# Patient Record
Sex: MALE | Race: WHITE | NOT HISPANIC OR LATINO | URBAN - METROPOLITAN AREA
[De-identification: names, ages, dates, MRNs, and addresses within clinical notes are randomized per-mention and may not be internally consistent; named-entity substitution may affect disease eponyms.]

---

## 2017-02-13 ENCOUNTER — TRANSCRIBE ORDERS (OUTPATIENT)
Dept: LAB | Facility: CLINIC | Age: 37
End: 2017-02-13

## 2017-02-13 ENCOUNTER — APPOINTMENT (OUTPATIENT)
Dept: LAB | Facility: CLINIC | Age: 37
End: 2017-02-13
Payer: COMMERCIAL

## 2017-02-13 DIAGNOSIS — R73.01 IMPAIRED FASTING GLUCOSE: ICD-10-CM

## 2017-02-13 DIAGNOSIS — E55.9 UNSPECIFIED VITAMIN D DEFICIENCY: ICD-10-CM

## 2017-02-13 DIAGNOSIS — E78.2 MIXED HYPERLIPIDEMIA: Primary | ICD-10-CM

## 2017-02-13 DIAGNOSIS — E78.2 MIXED HYPERLIPIDEMIA: ICD-10-CM

## 2017-02-13 LAB
25(OH)D3 SERPL-MCNC: 32.1 NG/ML (ref 30–100)
ALBUMIN SERPL BCP-MCNC: 3.3 G/DL (ref 3.5–5)
ALP SERPL-CCNC: 76 U/L (ref 46–116)
ALT SERPL W P-5'-P-CCNC: 56 U/L (ref 12–78)
ANION GAP SERPL CALCULATED.3IONS-SCNC: 7 MMOL/L (ref 4–13)
AST SERPL W P-5'-P-CCNC: 28 U/L (ref 5–45)
BILIRUB SERPL-MCNC: 0.27 MG/DL (ref 0.2–1)
BUN SERPL-MCNC: 15 MG/DL (ref 5–25)
CALCIUM SERPL-MCNC: 8.9 MG/DL (ref 8.3–10.1)
CHLORIDE SERPL-SCNC: 100 MMOL/L (ref 100–108)
CHOLEST SERPL-MCNC: 239 MG/DL (ref 50–200)
CO2 SERPL-SCNC: 29 MMOL/L (ref 21–32)
CREAT SERPL-MCNC: 0.97 MG/DL (ref 0.6–1.3)
EST. AVERAGE GLUCOSE BLD GHB EST-MCNC: 117 MG/DL
GFR SERPL CREATININE-BSD FRML MDRD: >60 ML/MIN/1.73SQ M
GLUCOSE SERPL-MCNC: 80 MG/DL (ref 65–140)
HBA1C MFR BLD: 5.7 % (ref 4.2–6.3)
HDLC SERPL-MCNC: 51 MG/DL (ref 40–60)
LDLC SERPL CALC-MCNC: 152 MG/DL (ref 0–100)
POTASSIUM SERPL-SCNC: 4.5 MMOL/L (ref 3.5–5.3)
PROT SERPL-MCNC: 8 G/DL (ref 6.4–8.2)
SODIUM SERPL-SCNC: 136 MMOL/L (ref 136–145)
TRIGL SERPL-MCNC: 180 MG/DL
TSH SERPL DL<=0.05 MIU/L-ACNC: 1.69 UIU/ML (ref 0.36–3.74)

## 2017-02-13 PROCEDURE — 80061 LIPID PANEL: CPT

## 2017-02-13 PROCEDURE — 80053 COMPREHEN METABOLIC PANEL: CPT

## 2017-02-13 PROCEDURE — 84443 ASSAY THYROID STIM HORMONE: CPT

## 2017-02-13 PROCEDURE — 36415 COLL VENOUS BLD VENIPUNCTURE: CPT

## 2017-02-13 PROCEDURE — 82306 VITAMIN D 25 HYDROXY: CPT

## 2017-02-13 PROCEDURE — 83036 HEMOGLOBIN GLYCOSYLATED A1C: CPT

## 2017-05-03 ENCOUNTER — TRANSCRIBE ORDERS (OUTPATIENT)
Dept: MRI IMAGING | Facility: CLINIC | Age: 37
End: 2017-05-03

## 2017-05-03 ENCOUNTER — APPOINTMENT (OUTPATIENT)
Dept: LAB | Facility: CLINIC | Age: 37
End: 2017-05-03
Payer: COMMERCIAL

## 2017-05-03 DIAGNOSIS — E78.01 ESSENTIAL FAMILIAL HYPERCHOLESTEROLEMIA: ICD-10-CM

## 2017-05-03 DIAGNOSIS — R33.9 RETENTION OF URINE, UNSPECIFIED: ICD-10-CM

## 2017-05-03 DIAGNOSIS — E78.2 MIXED HYPERLIPIDEMIA: ICD-10-CM

## 2017-05-03 DIAGNOSIS — E78.2 MIXED HYPERLIPIDEMIA: Primary | ICD-10-CM

## 2017-05-03 LAB
ALBUMIN SERPL BCP-MCNC: 3.2 G/DL (ref 3.5–5)
ALP SERPL-CCNC: 69 U/L (ref 46–116)
ALT SERPL W P-5'-P-CCNC: 68 U/L (ref 12–78)
ANION GAP SERPL CALCULATED.3IONS-SCNC: 6 MMOL/L (ref 4–13)
AST SERPL W P-5'-P-CCNC: 30 U/L (ref 5–45)
BACTERIA UR QL AUTO: NORMAL /HPF
BILIRUB SERPL-MCNC: 0.31 MG/DL (ref 0.2–1)
BILIRUB UR QL STRIP: NEGATIVE
BUN SERPL-MCNC: 12 MG/DL (ref 5–25)
CALCIUM SERPL-MCNC: 8.9 MG/DL (ref 8.3–10.1)
CHLORIDE SERPL-SCNC: 102 MMOL/L (ref 100–108)
CHOLEST SERPL-MCNC: 154 MG/DL (ref 50–200)
CLARITY UR: CLEAR
CO2 SERPL-SCNC: 30 MMOL/L (ref 21–32)
COLOR UR: YELLOW
CREAT SERPL-MCNC: 0.98 MG/DL (ref 0.6–1.3)
EST. AVERAGE GLUCOSE BLD GHB EST-MCNC: 120 MG/DL
GFR SERPL CREATININE-BSD FRML MDRD: >60 ML/MIN/1.73SQ M
GLUCOSE P FAST SERPL-MCNC: 86 MG/DL (ref 65–99)
GLUCOSE UR STRIP-MCNC: NEGATIVE MG/DL
HBA1C MFR BLD: 5.8 % (ref 4.2–6.3)
HDLC SERPL-MCNC: 44 MG/DL (ref 40–60)
HGB UR QL STRIP.AUTO: NEGATIVE
HYALINE CASTS #/AREA URNS LPF: NORMAL /LPF
KETONES UR STRIP-MCNC: NEGATIVE MG/DL
LDLC SERPL CALC-MCNC: 85 MG/DL (ref 0–100)
LEUKOCYTE ESTERASE UR QL STRIP: NEGATIVE
NITRITE UR QL STRIP: NEGATIVE
NON-SQ EPI CELLS URNS QL MICRO: NORMAL /HPF
PH UR STRIP.AUTO: 6.5 [PH] (ref 4.5–8)
POTASSIUM SERPL-SCNC: 4.4 MMOL/L (ref 3.5–5.3)
PROT SERPL-MCNC: 7.5 G/DL (ref 6.4–8.2)
PROT UR STRIP-MCNC: NEGATIVE MG/DL
PSA SERPL-MCNC: 0.1 NG/ML (ref 0–4)
RBC #/AREA URNS AUTO: NORMAL /HPF
SODIUM SERPL-SCNC: 138 MMOL/L (ref 136–145)
SP GR UR STRIP.AUTO: 1.03 (ref 1–1.03)
TRIGL SERPL-MCNC: 123 MG/DL
UROBILINOGEN UR QL STRIP.AUTO: 0.2 E.U./DL
WBC #/AREA URNS AUTO: NORMAL /HPF

## 2017-05-03 PROCEDURE — G0103 PSA SCREENING: HCPCS

## 2017-05-03 PROCEDURE — 80061 LIPID PANEL: CPT

## 2017-05-03 PROCEDURE — 36415 COLL VENOUS BLD VENIPUNCTURE: CPT

## 2017-05-03 PROCEDURE — 81001 URINALYSIS AUTO W/SCOPE: CPT | Performed by: FAMILY MEDICINE

## 2017-05-03 PROCEDURE — 83036 HEMOGLOBIN GLYCOSYLATED A1C: CPT

## 2017-05-03 PROCEDURE — 80053 COMPREHEN METABOLIC PANEL: CPT

## 2017-07-21 ENCOUNTER — TRANSCRIBE ORDERS (OUTPATIENT)
Dept: RADIOLOGY | Facility: CLINIC | Age: 37
End: 2017-07-21

## 2017-07-21 ENCOUNTER — APPOINTMENT (OUTPATIENT)
Dept: LAB | Facility: CLINIC | Age: 37
End: 2017-07-21
Payer: COMMERCIAL

## 2017-07-21 DIAGNOSIS — Z79.899 ENCOUNTER FOR LONG-TERM (CURRENT) USE OF OTHER MEDICATIONS: ICD-10-CM

## 2017-07-21 DIAGNOSIS — Z79.899 ENCOUNTER FOR LONG-TERM (CURRENT) USE OF OTHER MEDICATIONS: Primary | ICD-10-CM

## 2017-07-21 LAB
ALBUMIN SERPL BCP-MCNC: 3.5 G/DL (ref 3.5–5)
ALP SERPL-CCNC: 86 U/L (ref 46–116)
ALT SERPL W P-5'-P-CCNC: 54 U/L (ref 12–78)
AST SERPL W P-5'-P-CCNC: 25 U/L (ref 5–45)
BASOPHILS # BLD AUTO: 0.16 THOUSANDS/ΜL (ref 0–0.1)
BASOPHILS NFR BLD AUTO: 2 % (ref 0–1)
BILIRUB DIRECT SERPL-MCNC: 0.09 MG/DL (ref 0–0.2)
BILIRUB SERPL-MCNC: 0.35 MG/DL (ref 0.2–1)
CHOLEST SERPL-MCNC: 165 MG/DL (ref 50–200)
EOSINOPHIL # BLD AUTO: 0.12 THOUSAND/ΜL (ref 0–0.61)
EOSINOPHIL NFR BLD AUTO: 2 % (ref 0–6)
ERYTHROCYTE [DISTWIDTH] IN BLOOD BY AUTOMATED COUNT: 15.8 % (ref 11.6–15.1)
EST. AVERAGE GLUCOSE BLD GHB EST-MCNC: 123 MG/DL
GLUCOSE P FAST SERPL-MCNC: 81 MG/DL (ref 65–99)
HBA1C MFR BLD: 5.9 % (ref 4.2–6.3)
HCT VFR BLD AUTO: 46.2 % (ref 36.5–49.3)
HDLC SERPL-MCNC: 47 MG/DL (ref 40–60)
HGB BLD-MCNC: 15.8 G/DL (ref 12–17)
LDLC SERPL CALC-MCNC: 94 MG/DL (ref 0–100)
LYMPHOCYTES # BLD AUTO: 2.43 THOUSANDS/ΜL (ref 0.6–4.47)
LYMPHOCYTES NFR BLD AUTO: 35 % (ref 14–44)
MCH RBC QN AUTO: 32.4 PG (ref 26.8–34.3)
MCHC RBC AUTO-ENTMCNC: 34.2 G/DL (ref 31.4–37.4)
MCV RBC AUTO: 95 FL (ref 82–98)
MONOCYTES # BLD AUTO: 0.57 THOUSAND/ΜL (ref 0.17–1.22)
MONOCYTES NFR BLD AUTO: 8 % (ref 4–12)
NEUTROPHILS # BLD AUTO: 3.63 THOUSANDS/ΜL (ref 1.85–7.62)
NEUTS SEG NFR BLD AUTO: 53 % (ref 43–75)
NRBC BLD AUTO-RTO: 0 /100 WBCS
PLATELET # BLD AUTO: 331 THOUSANDS/UL (ref 149–390)
PMV BLD AUTO: 9.5 FL (ref 8.9–12.7)
PROT SERPL-MCNC: 8 G/DL (ref 6.4–8.2)
RBC # BLD AUTO: 4.88 MILLION/UL (ref 3.88–5.62)
T4 FREE SERPL-MCNC: 0.75 NG/DL (ref 0.76–1.46)
TRIGL SERPL-MCNC: 120 MG/DL
TSH SERPL DL<=0.05 MIU/L-ACNC: 1.1 UIU/ML (ref 0.36–3.74)
WBC # BLD AUTO: 6.95 THOUSAND/UL (ref 4.31–10.16)

## 2017-07-21 PROCEDURE — 84443 ASSAY THYROID STIM HORMONE: CPT

## 2017-07-21 PROCEDURE — 84439 ASSAY OF FREE THYROXINE: CPT

## 2017-07-21 PROCEDURE — 80076 HEPATIC FUNCTION PANEL: CPT

## 2017-07-21 PROCEDURE — 80061 LIPID PANEL: CPT

## 2017-07-21 PROCEDURE — 82947 ASSAY GLUCOSE BLOOD QUANT: CPT

## 2017-07-21 PROCEDURE — 83036 HEMOGLOBIN GLYCOSYLATED A1C: CPT

## 2017-07-21 PROCEDURE — 36415 COLL VENOUS BLD VENIPUNCTURE: CPT

## 2017-07-21 PROCEDURE — 85025 COMPLETE CBC W/AUTO DIFF WBC: CPT

## 2017-08-10 ENCOUNTER — TRANSCRIBE ORDERS (OUTPATIENT)
Dept: RADIOLOGY | Facility: CLINIC | Age: 37
End: 2017-08-10

## 2017-08-10 ENCOUNTER — APPOINTMENT (OUTPATIENT)
Dept: LAB | Facility: CLINIC | Age: 37
End: 2017-08-10
Payer: COMMERCIAL

## 2017-08-10 DIAGNOSIS — E78.2 MIXED HYPERLIPIDEMIA: ICD-10-CM

## 2017-08-10 DIAGNOSIS — E78.2 MIXED HYPERLIPIDEMIA: Primary | ICD-10-CM

## 2017-08-10 LAB
ALBUMIN SERPL BCP-MCNC: 3.4 G/DL (ref 3.5–5)
ALP SERPL-CCNC: 88 U/L (ref 46–116)
ALT SERPL W P-5'-P-CCNC: 50 U/L (ref 12–78)
ANION GAP SERPL CALCULATED.3IONS-SCNC: 8 MMOL/L (ref 4–13)
AST SERPL W P-5'-P-CCNC: 26 U/L (ref 5–45)
BILIRUB SERPL-MCNC: 0.58 MG/DL (ref 0.2–1)
BUN SERPL-MCNC: 14 MG/DL (ref 5–25)
CALCIUM SERPL-MCNC: 8.9 MG/DL (ref 8.3–10.1)
CHLORIDE SERPL-SCNC: 101 MMOL/L (ref 100–108)
CHOLEST SERPL-MCNC: 157 MG/DL (ref 50–200)
CO2 SERPL-SCNC: 29 MMOL/L (ref 21–32)
CREAT SERPL-MCNC: 0.93 MG/DL (ref 0.6–1.3)
EST. AVERAGE GLUCOSE BLD GHB EST-MCNC: 126 MG/DL
GFR SERPL CREATININE-BSD FRML MDRD: 103 ML/MIN/1.73SQ M
GLUCOSE P FAST SERPL-MCNC: 83 MG/DL (ref 65–99)
HBA1C MFR BLD: 6 % (ref 4.2–6.3)
HDLC SERPL-MCNC: 43 MG/DL (ref 40–60)
LDLC SERPL CALC-MCNC: 84 MG/DL (ref 0–100)
POTASSIUM SERPL-SCNC: 4.7 MMOL/L (ref 3.5–5.3)
PROT SERPL-MCNC: 7.3 G/DL (ref 6.4–8.2)
SODIUM SERPL-SCNC: 138 MMOL/L (ref 136–145)
TRIGL SERPL-MCNC: 152 MG/DL
TSH SERPL DL<=0.05 MIU/L-ACNC: 0.93 UIU/ML (ref 0.36–3.74)

## 2017-08-10 PROCEDURE — 83036 HEMOGLOBIN GLYCOSYLATED A1C: CPT

## 2017-08-10 PROCEDURE — 80061 LIPID PANEL: CPT

## 2017-08-10 PROCEDURE — 80053 COMPREHEN METABOLIC PANEL: CPT

## 2017-08-10 PROCEDURE — 36415 COLL VENOUS BLD VENIPUNCTURE: CPT

## 2017-08-10 PROCEDURE — 84443 ASSAY THYROID STIM HORMONE: CPT

## 2018-03-01 ENCOUNTER — TRANSCRIBE ORDERS (OUTPATIENT)
Dept: RADIOLOGY | Facility: CLINIC | Age: 38
End: 2018-03-01

## 2018-03-01 ENCOUNTER — APPOINTMENT (OUTPATIENT)
Dept: LAB | Facility: CLINIC | Age: 38
End: 2018-03-01
Payer: COMMERCIAL

## 2018-03-01 DIAGNOSIS — R73.01 IMPAIRED FASTING GLUCOSE: ICD-10-CM

## 2018-03-01 DIAGNOSIS — E78.5 HYPERLIPIDEMIA, UNSPECIFIED HYPERLIPIDEMIA TYPE: ICD-10-CM

## 2018-03-01 DIAGNOSIS — E55.9 AVITAMINOSIS D: Primary | ICD-10-CM

## 2018-03-01 LAB
25(OH)D3 SERPL-MCNC: 56.3 NG/ML (ref 30–100)
ALBUMIN SERPL BCP-MCNC: 3.2 G/DL (ref 3.5–5)
ALP SERPL-CCNC: 73 U/L (ref 46–116)
ALT SERPL W P-5'-P-CCNC: 45 U/L (ref 12–78)
ANION GAP SERPL CALCULATED.3IONS-SCNC: 4 MMOL/L (ref 4–13)
AST SERPL W P-5'-P-CCNC: 30 U/L (ref 5–45)
BASOPHILS # BLD AUTO: 0.1 THOUSANDS/ΜL (ref 0–0.1)
BASOPHILS NFR BLD AUTO: 1 % (ref 0–1)
BILIRUB SERPL-MCNC: 0.45 MG/DL (ref 0.2–1)
BUN SERPL-MCNC: 14 MG/DL (ref 5–25)
CALCIUM SERPL-MCNC: 8.5 MG/DL (ref 8.3–10.1)
CHLORIDE SERPL-SCNC: 103 MMOL/L (ref 100–108)
CHOLEST SERPL-MCNC: 141 MG/DL (ref 50–200)
CO2 SERPL-SCNC: 30 MMOL/L (ref 21–32)
CREAT SERPL-MCNC: 0.97 MG/DL (ref 0.6–1.3)
EOSINOPHIL # BLD AUTO: 0.11 THOUSAND/ΜL (ref 0–0.61)
EOSINOPHIL NFR BLD AUTO: 1 % (ref 0–6)
ERYTHROCYTE [DISTWIDTH] IN BLOOD BY AUTOMATED COUNT: 16.4 % (ref 11.6–15.1)
EST. AVERAGE GLUCOSE BLD GHB EST-MCNC: 117 MG/DL
GFR SERPL CREATININE-BSD FRML MDRD: 98 ML/MIN/1.73SQ M
GLUCOSE P FAST SERPL-MCNC: 83 MG/DL (ref 65–99)
HBA1C MFR BLD: 5.7 % (ref 4.2–6.3)
HCT VFR BLD AUTO: 43.7 % (ref 36.5–49.3)
HDLC SERPL-MCNC: 49 MG/DL (ref 40–60)
HGB BLD-MCNC: 15.1 G/DL (ref 12–17)
LDLC SERPL CALC-MCNC: 74 MG/DL (ref 0–100)
LYMPHOCYTES # BLD AUTO: 2.11 THOUSANDS/ΜL (ref 0.6–4.47)
LYMPHOCYTES NFR BLD AUTO: 23 % (ref 14–44)
MCH RBC QN AUTO: 32.3 PG (ref 26.8–34.3)
MCHC RBC AUTO-ENTMCNC: 34.6 G/DL (ref 31.4–37.4)
MCV RBC AUTO: 93 FL (ref 82–98)
MONOCYTES # BLD AUTO: 0.84 THOUSAND/ΜL (ref 0.17–1.22)
MONOCYTES NFR BLD AUTO: 9 % (ref 4–12)
NEUTROPHILS # BLD AUTO: 6.11 THOUSANDS/ΜL (ref 1.85–7.62)
NEUTS SEG NFR BLD AUTO: 66 % (ref 43–75)
NRBC BLD AUTO-RTO: 0 /100 WBCS
PLATELET # BLD AUTO: 353 THOUSANDS/UL (ref 149–390)
PMV BLD AUTO: 9.6 FL (ref 8.9–12.7)
POTASSIUM SERPL-SCNC: 4.5 MMOL/L (ref 3.5–5.3)
PROT SERPL-MCNC: 7.5 G/DL (ref 6.4–8.2)
RBC # BLD AUTO: 4.68 MILLION/UL (ref 3.88–5.62)
SODIUM SERPL-SCNC: 137 MMOL/L (ref 136–145)
TRIGL SERPL-MCNC: 89 MG/DL
TSH SERPL DL<=0.05 MIU/L-ACNC: 1.17 UIU/ML (ref 0.36–3.74)
WBC # BLD AUTO: 9.31 THOUSAND/UL (ref 4.31–10.16)

## 2018-03-01 PROCEDURE — 85025 COMPLETE CBC W/AUTO DIFF WBC: CPT

## 2018-03-01 PROCEDURE — 83036 HEMOGLOBIN GLYCOSYLATED A1C: CPT

## 2018-03-01 PROCEDURE — 80061 LIPID PANEL: CPT

## 2018-03-01 PROCEDURE — 84443 ASSAY THYROID STIM HORMONE: CPT

## 2018-03-01 PROCEDURE — 80053 COMPREHEN METABOLIC PANEL: CPT

## 2018-03-01 PROCEDURE — 82306 VITAMIN D 25 HYDROXY: CPT

## 2018-03-01 PROCEDURE — 36415 COLL VENOUS BLD VENIPUNCTURE: CPT

## 2018-07-10 ENCOUNTER — OFFICE VISIT (OUTPATIENT)
Dept: FAMILY MEDICINE CLINIC | Facility: CLINIC | Age: 38
End: 2018-07-10
Payer: COMMERCIAL

## 2018-07-10 VITALS
RESPIRATION RATE: 16 BRPM | DIASTOLIC BLOOD PRESSURE: 70 MMHG | HEART RATE: 100 BPM | WEIGHT: 201.6 LBS | SYSTOLIC BLOOD PRESSURE: 128 MMHG

## 2018-07-10 DIAGNOSIS — J01.80 OTHER ACUTE SINUSITIS, RECURRENCE NOT SPECIFIED: Primary | ICD-10-CM

## 2018-07-10 PROCEDURE — 87070 CULTURE OTHR SPECIMN AEROBIC: CPT | Performed by: FAMILY MEDICINE

## 2018-07-10 PROCEDURE — 99214 OFFICE O/P EST MOD 30 MIN: CPT | Performed by: FAMILY MEDICINE

## 2018-07-10 PROCEDURE — 87147 CULTURE TYPE IMMUNOLOGIC: CPT | Performed by: FAMILY MEDICINE

## 2018-07-10 RX ORDER — ADAPALENE 45 G/G
GEL TOPICAL
COMMUNITY
End: 2018-10-19 | Stop reason: SDUPTHER

## 2018-07-10 RX ORDER — AMOXICILLIN AND CLAVULANATE POTASSIUM 875; 125 MG/1; MG/1
1 TABLET, FILM COATED ORAL EVERY 12 HOURS SCHEDULED
Qty: 20 TABLET | Refills: 0 | Status: SHIPPED | OUTPATIENT
Start: 2018-07-10 | End: 2018-07-20

## 2018-07-10 RX ORDER — ATORVASTATIN CALCIUM 20 MG/1
20 TABLET, FILM COATED ORAL DAILY
COMMUNITY
End: 2018-10-19 | Stop reason: SDUPTHER

## 2018-07-10 RX ORDER — AZELASTINE 1 MG/ML
1 SPRAY, METERED NASAL 2 TIMES DAILY
COMMUNITY
End: 2018-10-19 | Stop reason: SDUPTHER

## 2018-07-10 RX ORDER — OMEGA-3-ACID ETHYL ESTERS 1 G/1
1 CAPSULE, LIQUID FILLED ORAL WEEKLY
COMMUNITY
End: 2018-11-01 | Stop reason: SDUPTHER

## 2018-07-10 RX ORDER — FEXOFENADINE HCL 180 MG/1
180 TABLET ORAL DAILY
COMMUNITY
End: 2018-11-01 | Stop reason: SDUPTHER

## 2018-07-10 NOTE — PROGRESS NOTES
Assessment/Plan:    No problem-specific Assessment & Plan notes found for this encounter  Plan:   1  Take Amoxicillin as prescribed with food  2  Follow up on 7/16     Diagnoses and all orders for this visit:    Other acute sinusitis, recurrence not specified  -     amoxicillin-clavulanate (AUGMENTIN) 875-125 mg per tablet; Take 1 tablet by mouth every 12 (twelve) hours for 10 days  -     Throat culture; Future  -     Throat culture    Other orders  -     omega-3-acid ethyl esters (LOVAZA) 1 g capsule; Take 1 g by mouth daily  -     azelastine (ASTELIN) 0 1 % nasal spray; 1 spray into each nostril 2 (two) times a day Use in each nostril as directed  -     fexofenadine (ALLEGRA) 180 MG tablet; Take 180 mg by mouth daily  -     atorvastatin (LIPITOR) 20 mg tablet; Take 20 mg by mouth daily  -     adapalene (DIFFERIN) 0 1 % gel; Apply topically daily at bedtime  -     metFORMIN (GLUCOPHAGE) 500 mg tablet; Take 500 mg by mouth 2 (two) times a day with meals          Subjective:      Patient ID: Chloé Graves is a 34 y o  male  34 yom presents with fever accompanying sore throat x3 days  Fever peaked at 102 yesterday  Pt is able to eat and drink well and complains of chills and a headache  Postnasal drip with cough  Denies N/V/D, SOB or difficulty breathing  Last dose Tylenol at 11am  Fever since Sunday, Runny nose since 2 weeks ago  Takes Allegra for seasonal allergies but has not taken any recently  Denies sick contacts or recent travel  Denies hx asthma  Overall his symptoms are worsening  There has not been any episodes of aspiration and choking  The following portions of the patient's history were reviewed and updated as appropriate: allergies, current medications, past family history, past medical history, past social history, past surgical history and problem list     Review of Systems   Constitutional: Positive for chills and fever  Negative for appetite change and unexpected weight change  HENT: Positive for congestion and sore throat  Negative for rhinorrhea and sneezing  Eyes: Negative for pain, redness and itching  Respiratory: Positive for cough  Negative for shortness of breath  Cardiovascular: Negative for chest pain and palpitations  Gastrointestinal: Negative for diarrhea, nausea and vomiting  Genitourinary: Negative for dysuria  Musculoskeletal: Negative for joint swelling  Skin: Negative for rash  Allergic/Immunologic: Positive for environmental allergies  Neurological: Positive for headaches  Negative for dizziness and light-headedness  Objective:      /70 (BP Location: Right arm, Patient Position: Sitting, Cuff Size: Standard)   Pulse 100   Resp 16   Wt 91 4 kg (201 lb 9 6 oz)          Physical Exam   Constitutional: He is oriented to person, place, and time  He appears well-developed and well-nourished  No distress  HENT:   Head: Normocephalic and atraumatic  Nose: Mucosal edema and sinus tenderness present  Right sinus exhibits maxillary sinus tenderness and frontal sinus tenderness  Left sinus exhibits maxillary sinus tenderness and frontal sinus tenderness  Eyes: EOM are normal  Pupils are equal, round, and reactive to light  Right eye exhibits no discharge  Left eye exhibits no discharge  No scleral icterus  Erythematous sclera b/l     Neck: Normal range of motion  Neck supple  Carotid bruit is not present  No tracheal deviation and no edema present  No thyromegaly present  Mild oropharyngeal erythema, no exudate noted  Tonsils 2+   Cardiovascular: Normal rate, regular rhythm and normal heart sounds  Pulmonary/Chest: Effort normal and breath sounds normal  He has no wheezes  He has no rales  Abdominal: Soft  There is no tenderness  Musculoskeletal: Normal range of motion  He exhibits no edema  Lymphadenopathy:     He has cervical adenopathy  Neurological: He is alert and oriented to person, place, and time   He has normal reflexes  Skin: Skin is warm and dry  No rash noted  Psychiatric: He has a normal mood and affect   His behavior is normal  Judgment and thought content normal

## 2018-07-14 LAB — BACTERIA THROAT CULT: ABNORMAL

## 2018-07-16 ENCOUNTER — OFFICE VISIT (OUTPATIENT)
Dept: FAMILY MEDICINE CLINIC | Facility: CLINIC | Age: 38
End: 2018-07-16
Payer: COMMERCIAL

## 2018-07-16 VITALS
SYSTOLIC BLOOD PRESSURE: 120 MMHG | DIASTOLIC BLOOD PRESSURE: 76 MMHG | WEIGHT: 200 LBS | TEMPERATURE: 97.6 F | RESPIRATION RATE: 16 BRPM | OXYGEN SATURATION: 98 % | HEART RATE: 107 BPM

## 2018-07-16 DIAGNOSIS — L70.0 ACNE VULGARIS: Chronic | ICD-10-CM

## 2018-07-16 DIAGNOSIS — E78.2 MIXED HYPERLIPIDEMIA: Chronic | ICD-10-CM

## 2018-07-16 DIAGNOSIS — H61.23 BILATERAL IMPACTED CERUMEN: ICD-10-CM

## 2018-07-16 DIAGNOSIS — K62.5 RECTAL BLEEDING: Primary | ICD-10-CM

## 2018-07-16 DIAGNOSIS — R73.03 PREDIABETES: Chronic | ICD-10-CM

## 2018-07-16 PROBLEM — J01.80 OTHER ACUTE SINUSITIS: Status: RESOLVED | Noted: 2018-07-10 | Resolved: 2018-07-16

## 2018-07-16 PROCEDURE — 99214 OFFICE O/P EST MOD 30 MIN: CPT | Performed by: FAMILY MEDICINE

## 2018-07-16 NOTE — LETTER
July 16, 2018     Patient: Tanya Garnica   YOB: 1980   Date of Visit: 7/16/2018       To Whom it May Concern:    Tanya Garnica is under my professional care  He was seen in my office on 7/16/2018  He may return to work on 07/17/2018       If you have any questions or concerns, please don't hesitate to call           Sincerely,          Tonia Calles MD        CC: No Recipients

## 2018-07-16 NOTE — PROGRESS NOTES
Assessment/Plan:    No problem-specific Assessment & Plan notes found for this encounter  Problem List Items Addressed This Visit     Acne vulgaris (Chronic)    Relevant Orders    Ambulatory referral to Dermatology    Rectal bleeding - Primary    Relevant Orders    Ambulatory referral to Colorectal Surgery    Comprehensive metabolic panel    CBC and differential    Mixed hyperlipidemia (Chronic)    Relevant Orders    Comprehensive metabolic panel    Lipid panel    CBC and differential    TSH, 3rd generation with Free T4 reflex    Prediabetes (Chronic)    Relevant Orders    Hemoglobin A1C    Comprehensive metabolic panel    CBC and differential      Other Visit Diagnoses     Bilateral impacted cerumen        Relevant Orders    Ambulatory Referral to Otolaryngology            Subjective:      Patient ID: Jennifer Mo is a 34 y o  male  42-year-old patient with past medical history of Down syndrome, hyperlipidemia, obesity, prediabetes, and recent respiratory infection presents today to follow-up on his chronic conditions as well as his recent infection  Patient does have developmental delays in speech, language, cognitive function  The history was obtained through his mother who is the primary caretaker  His infection is 95% improved and he is afebrile and his appetite is back to normal     Overall as chronic conditions have been stable and well controlled  He has tried to control his diet, exercise, and lose weight  He is taking all his medications as prescribed without any significant side effects  Due to his recent illness he did not obtain his usual blood work prior to today's visit so he will schedule that over the next week or two  Mom also states that over the past several months she has noticed slight rectal bleeding that is painless and occurs only when wiping  She has not seen any external hemorrhoids  There is no strong family history of colon cancer or GI malignancies    Mom is requesting consultation with a colorectal specialist         The following portions of the patient's history were reviewed and updated as appropriate: allergies, current medications, past family history, past medical history, past social history, past surgical history and problem list     Review of Systems   Constitutional: Negative for appetite change and unexpected weight change  Eyes: Negative for visual disturbance  Respiratory: Negative for chest tightness and shortness of breath  Cardiovascular: Negative for chest pain, palpitations and leg swelling  Gastrointestinal: Positive for anal bleeding and blood in stool  Negative for constipation, diarrhea and nausea  Genitourinary: Negative for frequency and hematuria  Skin: Negative for color change  Neurological: Negative for dizziness  Objective:      /76 (BP Location: Left arm, Patient Position: Sitting, Cuff Size: Standard)   Pulse (!) 107   Temp 97 6 °F (36 4 °C) (Tympanic)   Resp 16   Wt 90 7 kg (200 lb)   SpO2 98%          Physical Exam   Constitutional: He appears well-developed and well-nourished  No distress  HENT:   Head: Normocephalic and atraumatic  Neck: Normal range of motion  Neck supple  Carotid bruit is not present  No edema present  Cardiovascular: Normal rate, regular rhythm and normal heart sounds  Pulmonary/Chest: Effort normal and breath sounds normal  He has no wheezes  He has no rales  Genitourinary:   Genitourinary Comments: Mom deferred examination today because she will be checking in with a colorectal specialist    Neurological: He is alert  Psychiatric: He has a normal mood and affect   His behavior is normal  Judgment and thought content normal

## 2018-08-03 ENCOUNTER — APPOINTMENT (OUTPATIENT)
Dept: LAB | Facility: CLINIC | Age: 38
End: 2018-08-03
Payer: COMMERCIAL

## 2018-08-03 ENCOUNTER — TRANSCRIBE ORDERS (OUTPATIENT)
Dept: RADIOLOGY | Facility: CLINIC | Age: 38
End: 2018-08-03

## 2018-08-03 DIAGNOSIS — R33.9 RETENTION OF URINE, UNSPECIFIED: ICD-10-CM

## 2018-08-03 DIAGNOSIS — E66.8 CONSTITUTIONAL OBESITY: ICD-10-CM

## 2018-08-03 DIAGNOSIS — E78.2 MIXED HYPERLIPIDEMIA: Primary | ICD-10-CM

## 2018-08-03 DIAGNOSIS — E78.2 MIXED HYPERLIPIDEMIA: ICD-10-CM

## 2018-08-03 DIAGNOSIS — R73.01 IMPAIRED FASTING GLUCOSE: ICD-10-CM

## 2018-08-03 LAB
ALBUMIN SERPL BCP-MCNC: 3.3 G/DL (ref 3.5–5)
ALP SERPL-CCNC: 79 U/L (ref 46–116)
ALT SERPL W P-5'-P-CCNC: 50 U/L (ref 12–78)
ANION GAP SERPL CALCULATED.3IONS-SCNC: 7 MMOL/L (ref 4–13)
AST SERPL W P-5'-P-CCNC: 22 U/L (ref 5–45)
BACTERIA UR QL AUTO: NORMAL /HPF
BASOPHILS # BLD AUTO: 0.08 THOUSANDS/ΜL (ref 0–0.1)
BASOPHILS NFR BLD AUTO: 1 % (ref 0–1)
BILIRUB SERPL-MCNC: 0.5 MG/DL (ref 0.2–1)
BILIRUB UR QL STRIP: NEGATIVE
BUN SERPL-MCNC: 14 MG/DL (ref 5–25)
CALCIUM SERPL-MCNC: 8.9 MG/DL (ref 8.3–10.1)
CHLORIDE SERPL-SCNC: 102 MMOL/L (ref 100–108)
CHOLEST SERPL-MCNC: 206 MG/DL (ref 50–200)
CLARITY UR: CLEAR
CO2 SERPL-SCNC: 28 MMOL/L (ref 21–32)
COLOR UR: YELLOW
CREAT SERPL-MCNC: 0.79 MG/DL (ref 0.6–1.3)
EOSINOPHIL # BLD AUTO: 0.14 THOUSAND/ΜL (ref 0–0.61)
EOSINOPHIL NFR BLD AUTO: 2 % (ref 0–6)
ERYTHROCYTE [DISTWIDTH] IN BLOOD BY AUTOMATED COUNT: 16.3 % (ref 11.6–15.1)
EST. AVERAGE GLUCOSE BLD GHB EST-MCNC: 126 MG/DL
GFR SERPL CREATININE-BSD FRML MDRD: 112 ML/MIN/1.73SQ M
GLUCOSE P FAST SERPL-MCNC: 89 MG/DL (ref 65–99)
GLUCOSE UR STRIP-MCNC: NEGATIVE MG/DL
HBA1C MFR BLD: 6 % (ref 4.2–6.3)
HCT VFR BLD AUTO: 43.4 % (ref 36.5–49.3)
HDLC SERPL-MCNC: 45 MG/DL (ref 40–60)
HGB BLD-MCNC: 14.6 G/DL (ref 12–17)
HGB UR QL STRIP.AUTO: NEGATIVE
HYALINE CASTS #/AREA URNS LPF: NORMAL /LPF
IMM GRANULOCYTES # BLD AUTO: 0.03 THOUSAND/UL (ref 0–0.2)
IMM GRANULOCYTES NFR BLD AUTO: 0 % (ref 0–2)
KETONES UR STRIP-MCNC: NEGATIVE MG/DL
LDLC SERPL CALC-MCNC: 128 MG/DL (ref 0–100)
LEUKOCYTE ESTERASE UR QL STRIP: NEGATIVE
LYMPHOCYTES # BLD AUTO: 2.23 THOUSANDS/ΜL (ref 0.6–4.47)
LYMPHOCYTES NFR BLD AUTO: 33 % (ref 14–44)
MCH RBC QN AUTO: 31.7 PG (ref 26.8–34.3)
MCHC RBC AUTO-ENTMCNC: 33.6 G/DL (ref 31.4–37.4)
MCV RBC AUTO: 94 FL (ref 82–98)
MONOCYTES # BLD AUTO: 0.56 THOUSAND/ΜL (ref 0.17–1.22)
MONOCYTES NFR BLD AUTO: 8 % (ref 4–12)
NEUTROPHILS # BLD AUTO: 3.7 THOUSANDS/ΜL (ref 1.85–7.62)
NEUTS SEG NFR BLD AUTO: 56 % (ref 43–75)
NITRITE UR QL STRIP: NEGATIVE
NON-SQ EPI CELLS URNS QL MICRO: NORMAL /HPF
NONHDLC SERPL-MCNC: 161 MG/DL
NRBC BLD AUTO-RTO: 0 /100 WBCS
PH UR STRIP.AUTO: 7.5 [PH] (ref 4.5–8)
PLATELET # BLD AUTO: 303 THOUSANDS/UL (ref 149–390)
PMV BLD AUTO: 9.5 FL (ref 8.9–12.7)
POTASSIUM SERPL-SCNC: 4.1 MMOL/L (ref 3.5–5.3)
PROT SERPL-MCNC: 7.7 G/DL (ref 6.4–8.2)
PROT UR STRIP-MCNC: NEGATIVE MG/DL
RBC # BLD AUTO: 4.6 MILLION/UL (ref 3.88–5.62)
RBC #/AREA URNS AUTO: NORMAL /HPF
SODIUM SERPL-SCNC: 137 MMOL/L (ref 136–145)
SP GR UR STRIP.AUTO: 1.02 (ref 1–1.03)
TRIGL SERPL-MCNC: 164 MG/DL
UROBILINOGEN UR QL STRIP.AUTO: 0.2 E.U./DL
WBC # BLD AUTO: 6.74 THOUSAND/UL (ref 4.31–10.16)
WBC #/AREA URNS AUTO: NORMAL /HPF

## 2018-08-03 PROCEDURE — 81001 URINALYSIS AUTO W/SCOPE: CPT | Performed by: FAMILY MEDICINE

## 2018-08-03 PROCEDURE — 80061 LIPID PANEL: CPT

## 2018-08-03 PROCEDURE — 36415 COLL VENOUS BLD VENIPUNCTURE: CPT

## 2018-08-03 PROCEDURE — 80053 COMPREHEN METABOLIC PANEL: CPT

## 2018-08-03 PROCEDURE — 83036 HEMOGLOBIN GLYCOSYLATED A1C: CPT

## 2018-08-03 PROCEDURE — 85025 COMPLETE CBC W/AUTO DIFF WBC: CPT

## 2018-08-20 ENCOUNTER — ANESTHESIA EVENT (OUTPATIENT)
Dept: PERIOP | Facility: HOSPITAL | Age: 38
End: 2018-08-20
Payer: COMMERCIAL

## 2018-08-21 ENCOUNTER — ANESTHESIA (OUTPATIENT)
Dept: PERIOP | Facility: HOSPITAL | Age: 38
End: 2018-08-21
Payer: COMMERCIAL

## 2018-08-21 ENCOUNTER — HOSPITAL ENCOUNTER (OUTPATIENT)
Facility: HOSPITAL | Age: 38
Setting detail: OUTPATIENT SURGERY
Discharge: HOME | End: 2018-08-21
Attending: COLON & RECTAL SURGERY | Admitting: COLON & RECTAL SURGERY
Payer: COMMERCIAL

## 2018-08-21 VITALS
WEIGHT: 195 LBS | DIASTOLIC BLOOD PRESSURE: 73 MMHG | HEIGHT: 64 IN | HEART RATE: 90 BPM | OXYGEN SATURATION: 97 % | BODY MASS INDEX: 33.29 KG/M2 | TEMPERATURE: 97.4 F | RESPIRATION RATE: 18 BRPM | SYSTOLIC BLOOD PRESSURE: 132 MMHG

## 2018-08-21 RX ORDER — SODIUM CHLORIDE 9 MG/ML
50 INJECTION, SOLUTION INTRAVENOUS CONTINUOUS
Status: DISCONTINUED | OUTPATIENT
Start: 2018-08-21 | End: 2018-08-21 | Stop reason: HOSPADM

## 2018-08-21 RX ORDER — PROPOFOL 10 MG/ML
INJECTION, EMULSION INTRAVENOUS CONTINUOUS PRN
Status: DISCONTINUED | OUTPATIENT
Start: 2018-08-21 | End: 2018-08-21 | Stop reason: SURG

## 2018-08-21 RX ORDER — LIDOCAINE HYDROCHLORIDE 10 MG/ML
INJECTION, SOLUTION INFILTRATION; PERINEURAL AS NEEDED
Status: DISCONTINUED | OUTPATIENT
Start: 2018-08-21 | End: 2018-08-21 | Stop reason: SURG

## 2018-08-21 RX ORDER — PROPOFOL 10 MG/ML
INJECTION, EMULSION INTRAVENOUS AS NEEDED
Status: DISCONTINUED | OUTPATIENT
Start: 2018-08-21 | End: 2018-08-21 | Stop reason: SURG

## 2018-08-21 RX ORDER — MULTIVIT WITH MINERALS/LUTEIN
1000 TABLET ORAL WEEKLY
COMMUNITY
End: 2018-11-01 | Stop reason: SDUPTHER

## 2018-08-21 RX ADMIN — PROPOFOL 50 MG: 10 INJECTION, EMULSION INTRAVENOUS at 10:38

## 2018-08-21 RX ADMIN — PROPOFOL 180 MCG/KG/MIN: 10 INJECTION, EMULSION INTRAVENOUS at 10:36

## 2018-08-21 RX ADMIN — LIDOCAINE HYDROCHLORIDE 50 MG: 10 INJECTION, SOLUTION INFILTRATION; PERINEURAL at 10:36

## 2018-08-21 RX ADMIN — PROPOFOL 80 MG: 10 INJECTION, EMULSION INTRAVENOUS at 10:36

## 2018-08-21 RX ADMIN — SODIUM CHLORIDE 50 ML/HR: 9 INJECTION, SOLUTION INTRAVENOUS at 09:59

## 2018-08-21 NOTE — DISCHARGE SUMMARY
[facility]                                                                                 DISCHARGE SUMMARY        PATIENT NAME: Deana Park    :  1980  MRN: [id number]  Pt Location:  GI ROOM 02    DISCHARGE DATE: 2018    PROCEDURE: Procedure(s) (LRB):  COLONOSCOPY (N/A)    Anesthesia Type: IV Sedation with Anesthesia    Complications: None    Specimen(s):  * No specimens in log *    HOSPITAL COURSE: The patient was admitted for elective procedure  The procedure was uncomplicated and the the patient was discharged home post procedure          SIGNATURE: Viky Vazquez MD  DATE: 2018  TIME: 10:51 AM

## 2018-08-21 NOTE — DISCHARGE INSTRUCTIONS
[facility] [address], [address] [place] [address] [id number]  Phone: [phone]    DISCHARGE INSTRUCTIONS:    1   _x__ Complete Exam - Normal    2   ___ Exam normal, but entire colon not seen  We will discuss this with you  3   ___ Polyp(s) removed by "burning" - NO pathology report will follow    4   ___ Polyp(s) removed by excision  Pathology report will be available in 4-5 days   Someone from our office will call you with results  5   ___ Exam prompted biopsies  Pathology report will be available in 4-5 days   Someone from our office will call you with results  6   ___ Exam demonstrated findings that need treatment  Prescriptions will be   Given to you  Return to our office in ____ weeks  Please call for appt  7   ___ Original office visit or colonoscopy findings necessitate an office visit  Please call to set up a new appointment    8   ___ Medication  __________________________________________        [address]:    - Go straight home and rest today    - No driving or drinking alcohol for 24 hours    - Resume regular diet and medications unless otherwise instructed  Coumadin and Plavix are blood thinners  You can resume these medications on __________      IF YOU ARE HAVING ANY FEVER, BLEEDING OR PERSISTENT PAIN IN THE ABDOMEN, PLEASE CALL OUR OFFICE ANY DAY OR TIME  [phone]

## 2018-08-21 NOTE — ANESTHESIA PREPROCEDURE EVALUATION
Review of Systems/Medical History      No history of anesthetic complications     Cardiovascular  Hyperlipidemia,   Comment: Down s syndrome ,  Pulmonary  Sleep apnea (???) ,        GI/Hepatic    GI bleeding , history of,             Endo/Other  Diabetes ,      GYN       Hematology   Musculoskeletal       Neurology      Comment: Understands and verbalies in simple words  Psychology           Physical Exam    Airway    Mallampati score: III  TM Distance: >3 FB  Neck ROM: full     Dental   Comment: No loose teeth  High arch palate,     Cardiovascular  Cardiovascular exam normal    Pulmonary  Pulmonary exam normal     Other Findings        Anesthesia Plan  ASA Score- 3     Anesthesia Type- IV sedation with anesthesia with ASA Monitors  Additional Monitors:   Airway Plan:         Plan Factors-Patient not instructed to abstain from smoking on day of procedure  Patient did not smoke on day of surgery  Induction- intravenous  Postoperative Plan-     Informed Consent- Anesthetic plan and risks discussed with patient               Lab Results   Component Value Date    HGBA1C 6 0 08/03/2018       Lab Results   Component Value Date     08/03/2018    K 4 1 08/03/2018     08/03/2018    CO2 28 08/03/2018    ANIONGAP 7 08/03/2018    BUN 14 08/03/2018    CREATININE 0 79 08/03/2018    GLUCOSE 80 02/13/2017    GLUF 89 08/03/2018    CALCIUM 8 9 08/03/2018    AST 22 08/03/2018    ALT 50 08/03/2018    ALKPHOS 79 08/03/2018    PROT 7 7 08/03/2018    BILITOT 0 50 08/03/2018    EGFR 112 08/03/2018       Lab Results   Component Value Date    WBC 6 74 08/03/2018    HGB 14 6 08/03/2018    HCT 43 4 08/03/2018    MCV 94 08/03/2018     08/03/2018

## 2018-08-21 NOTE — ANESTHESIA POSTPROCEDURE EVALUATION
Post-Op Assessment Note      CV Status:  Stable    Mental Status:  Awake    Hydration Status:  Stable    PONV Controlled:  None    Airway Patency:  Patent    Post Op Vitals Reviewed: No          Staff: CRNA           BP   115/61   Temp     Pulse  101   Resp 12   SpO2 95% RA

## 2018-08-21 NOTE — OP NOTE
OPERATIVE REPORT  PATIENT NAME: Stepan Moe    :  1980  MRN: [id number]  Pt Location:  GI ROOM 02    SURGERY DATE: 2018    Indications:  64-year-old male with Down syndrome and rectal bleeding  Procedure:  Colonoscopy to cecum  Findings:  Normal colonoscopy  Anesthesia Type: IV Sedation with Anesthesia    Complications: None      Procedure: The patient was in the left lateral position  Sedation was administered by anesthesia  Rectal exam was unremarkable  The scope was introduced and passed to the cecum  The bowel preparation was good  The scope was withdrawn  There were no mucosal lesions seen  The scope was retroflexed in the rectum showing small internal hemorrhoids  Impression:  Rectal bleeding likely related to internal hemorrhoids or possibly external skin irritation  Plan:  Repeat colonoscopy in 10 years  Specimen(s):  * No specimens in log *      Attestation: I was present for the entire procedure        Patient Disposition: PACU      SIGNATURE: Jyoti Doyle MD  DATE: 2018  TIME: 10:49 AM

## 2018-08-31 DIAGNOSIS — E11.8 TYPE 2 DIABETES MELLITUS WITH COMPLICATION, UNSPECIFIED WHETHER LONG TERM INSULIN USE: Primary | ICD-10-CM

## 2018-10-12 ENCOUNTER — TRANSCRIBE ORDERS (OUTPATIENT)
Dept: RADIOLOGY | Facility: CLINIC | Age: 38
End: 2018-10-12

## 2018-10-12 ENCOUNTER — APPOINTMENT (OUTPATIENT)
Dept: LAB | Facility: CLINIC | Age: 38
End: 2018-10-12
Payer: COMMERCIAL

## 2018-10-12 DIAGNOSIS — R74.8 ACID PHOSPHATASE ELEVATED: ICD-10-CM

## 2018-10-12 DIAGNOSIS — M25.532 LEFT WRIST PAIN: ICD-10-CM

## 2018-10-12 DIAGNOSIS — E78.2 MIXED HYPERLIPIDEMIA: Chronic | ICD-10-CM

## 2018-10-12 DIAGNOSIS — M17.0 PRIMARY OSTEOARTHRITIS OF BOTH KNEES: ICD-10-CM

## 2018-10-12 DIAGNOSIS — M54.2 CERVICALGIA: ICD-10-CM

## 2018-10-12 DIAGNOSIS — M25.561 RIGHT KNEE PAIN, UNSPECIFIED CHRONICITY: ICD-10-CM

## 2018-10-12 DIAGNOSIS — M05.89 OTHER RHEUMATOID ARTHRITIS WITH RHEUMATOID FACTOR OF MULTIPLE SITES (HCC): Primary | ICD-10-CM

## 2018-10-12 DIAGNOSIS — K62.5 RECTAL BLEEDING: ICD-10-CM

## 2018-10-12 DIAGNOSIS — M05.89 OTHER RHEUMATOID ARTHRITIS WITH RHEUMATOID FACTOR OF MULTIPLE SITES (HCC): ICD-10-CM

## 2018-10-12 DIAGNOSIS — R73.03 PREDIABETES: Chronic | ICD-10-CM

## 2018-10-12 LAB
ALBUMIN SERPL BCP-MCNC: 3.3 G/DL (ref 3.5–5)
ALP SERPL-CCNC: 76 U/L (ref 46–116)
ALT SERPL W P-5'-P-CCNC: 47 U/L (ref 12–78)
ANION GAP SERPL CALCULATED.3IONS-SCNC: 5 MMOL/L (ref 4–13)
AST SERPL W P-5'-P-CCNC: 24 U/L (ref 5–45)
BASOPHILS # BLD AUTO: 0.12 THOUSANDS/ΜL (ref 0–0.1)
BASOPHILS NFR BLD AUTO: 2 % (ref 0–1)
BILIRUB SERPL-MCNC: 0.34 MG/DL (ref 0.2–1)
BUN SERPL-MCNC: 12 MG/DL (ref 5–25)
CALCIUM SERPL-MCNC: 8.7 MG/DL (ref 8.3–10.1)
CHLORIDE SERPL-SCNC: 103 MMOL/L (ref 100–108)
CHOLEST SERPL-MCNC: 161 MG/DL (ref 50–200)
CO2 SERPL-SCNC: 28 MMOL/L (ref 21–32)
CREAT SERPL-MCNC: 0.85 MG/DL (ref 0.6–1.3)
EOSINOPHIL # BLD AUTO: 0.15 THOUSAND/ΜL (ref 0–0.61)
EOSINOPHIL NFR BLD AUTO: 2 % (ref 0–6)
ERYTHROCYTE [DISTWIDTH] IN BLOOD BY AUTOMATED COUNT: 15.9 % (ref 11.6–15.1)
EST. AVERAGE GLUCOSE BLD GHB EST-MCNC: 120 MG/DL
GFR SERPL CREATININE-BSD FRML MDRD: 109 ML/MIN/1.73SQ M
GLUCOSE P FAST SERPL-MCNC: 90 MG/DL (ref 65–99)
HBA1C MFR BLD: 5.8 % (ref 4.2–6.3)
HCT VFR BLD AUTO: 44.9 % (ref 36.5–49.3)
HDLC SERPL-MCNC: 43 MG/DL (ref 40–60)
HGB BLD-MCNC: 15.1 G/DL (ref 12–17)
IMM GRANULOCYTES # BLD AUTO: 0.05 THOUSAND/UL (ref 0–0.2)
IMM GRANULOCYTES NFR BLD AUTO: 1 % (ref 0–2)
LDLC SERPL CALC-MCNC: 78 MG/DL (ref 0–100)
LYMPHOCYTES # BLD AUTO: 2.14 THOUSANDS/ΜL (ref 0.6–4.47)
LYMPHOCYTES NFR BLD AUTO: 31 % (ref 14–44)
MCH RBC QN AUTO: 31.9 PG (ref 26.8–34.3)
MCHC RBC AUTO-ENTMCNC: 33.6 G/DL (ref 31.4–37.4)
MCV RBC AUTO: 95 FL (ref 82–98)
MONOCYTES # BLD AUTO: 0.63 THOUSAND/ΜL (ref 0.17–1.22)
MONOCYTES NFR BLD AUTO: 9 % (ref 4–12)
NEUTROPHILS # BLD AUTO: 3.9 THOUSANDS/ΜL (ref 1.85–7.62)
NEUTS SEG NFR BLD AUTO: 55 % (ref 43–75)
NONHDLC SERPL-MCNC: 118 MG/DL
NRBC BLD AUTO-RTO: 0 /100 WBCS
PLATELET # BLD AUTO: 370 THOUSANDS/UL (ref 149–390)
PMV BLD AUTO: 9.3 FL (ref 8.9–12.7)
POTASSIUM SERPL-SCNC: 4.1 MMOL/L (ref 3.5–5.3)
PROT SERPL-MCNC: 7.7 G/DL (ref 6.4–8.2)
RBC # BLD AUTO: 4.74 MILLION/UL (ref 3.88–5.62)
SODIUM SERPL-SCNC: 136 MMOL/L (ref 136–145)
TRIGL SERPL-MCNC: 198 MG/DL
TSH SERPL DL<=0.05 MIU/L-ACNC: 1.03 UIU/ML (ref 0.36–3.74)
WBC # BLD AUTO: 6.99 THOUSAND/UL (ref 4.31–10.16)

## 2018-10-12 PROCEDURE — 83036 HEMOGLOBIN GLYCOSYLATED A1C: CPT

## 2018-10-12 PROCEDURE — 84443 ASSAY THYROID STIM HORMONE: CPT

## 2018-10-12 PROCEDURE — 80053 COMPREHEN METABOLIC PANEL: CPT

## 2018-10-12 PROCEDURE — 36415 COLL VENOUS BLD VENIPUNCTURE: CPT

## 2018-10-12 PROCEDURE — 85025 COMPLETE CBC W/AUTO DIFF WBC: CPT

## 2018-10-12 PROCEDURE — 80061 LIPID PANEL: CPT

## 2018-10-19 ENCOUNTER — OFFICE VISIT (OUTPATIENT)
Dept: FAMILY MEDICINE CLINIC | Facility: CLINIC | Age: 38
End: 2018-10-19
Payer: COMMERCIAL

## 2018-10-19 VITALS
RESPIRATION RATE: 18 BRPM | BODY MASS INDEX: 34.67 KG/M2 | OXYGEN SATURATION: 98 % | DIASTOLIC BLOOD PRESSURE: 70 MMHG | HEART RATE: 88 BPM | TEMPERATURE: 97.6 F | SYSTOLIC BLOOD PRESSURE: 120 MMHG | WEIGHT: 202 LBS

## 2018-10-19 DIAGNOSIS — J20.8 ACUTE BRONCHITIS DUE TO OTHER SPECIFIED ORGANISMS: ICD-10-CM

## 2018-10-19 DIAGNOSIS — L70.0 ACNE VULGARIS: Chronic | ICD-10-CM

## 2018-10-19 DIAGNOSIS — E55.9 VITAMIN D DEFICIENCY: Chronic | ICD-10-CM

## 2018-10-19 DIAGNOSIS — R73.03 PREDIABETES: Chronic | ICD-10-CM

## 2018-10-19 DIAGNOSIS — S00.81XA ABRASION OF FOREHEAD, INITIAL ENCOUNTER: ICD-10-CM

## 2018-10-19 DIAGNOSIS — Z23 ENCOUNTER FOR IMMUNIZATION: Primary | ICD-10-CM

## 2018-10-19 DIAGNOSIS — J45.909 ASTHMA DUE TO ENVIRONMENTAL ALLERGIES: Chronic | ICD-10-CM

## 2018-10-19 DIAGNOSIS — E78.2 MIXED HYPERLIPIDEMIA: Chronic | ICD-10-CM

## 2018-10-19 PROBLEM — R73.01 IMPAIRED FASTING GLUCOSE: Chronic | Status: ACTIVE | Noted: 2018-10-19

## 2018-10-19 PROCEDURE — 90715 TDAP VACCINE 7 YRS/> IM: CPT

## 2018-10-19 PROCEDURE — G0008 ADMIN INFLUENZA VIRUS VAC: HCPCS

## 2018-10-19 PROCEDURE — 99214 OFFICE O/P EST MOD 30 MIN: CPT | Performed by: FAMILY MEDICINE

## 2018-10-19 PROCEDURE — 1036F TOBACCO NON-USER: CPT | Performed by: FAMILY MEDICINE

## 2018-10-19 PROCEDURE — 90472 IMMUNIZATION ADMIN EACH ADD: CPT

## 2018-10-19 PROCEDURE — 90686 IIV4 VACC NO PRSV 0.5 ML IM: CPT

## 2018-10-19 RX ORDER — ERGOCALCIFEROL 1.25 MG/1
50000 CAPSULE ORAL WEEKLY
Qty: 13 CAPSULE | Refills: 3 | Status: SHIPPED | OUTPATIENT
Start: 2018-10-19 | End: 2018-11-01 | Stop reason: SDUPTHER

## 2018-10-19 RX ORDER — AZITHROMYCIN 250 MG/1
TABLET, FILM COATED ORAL
Qty: 6 TABLET | Refills: 0 | Status: SHIPPED | OUTPATIENT
Start: 2018-10-19 | End: 2018-10-23

## 2018-10-19 RX ORDER — ADAPALENE 45 G/G
GEL TOPICAL
Qty: 135 G | Refills: 0 | Status: SHIPPED | OUTPATIENT
Start: 2018-10-19 | End: 2019-02-15

## 2018-10-19 RX ORDER — ADAPALENE 45 G/G
GEL TOPICAL
Qty: 135 G | Refills: 3 | Status: SHIPPED | OUTPATIENT
Start: 2018-10-19 | End: 2018-10-19 | Stop reason: SDUPTHER

## 2018-10-19 RX ORDER — ATORVASTATIN CALCIUM 20 MG/1
20 TABLET, FILM COATED ORAL DAILY
Qty: 90 TABLET | Refills: 3 | Status: SHIPPED | OUTPATIENT
Start: 2018-10-19 | End: 2018-11-01 | Stop reason: SDUPTHER

## 2018-10-19 RX ORDER — AZELASTINE 1 MG/ML
2 SPRAY, METERED NASAL 2 TIMES DAILY
Qty: 90 ML | Refills: 3 | Status: SHIPPED | OUTPATIENT
Start: 2018-10-19 | End: 2018-11-01 | Stop reason: SDUPTHER

## 2018-10-19 NOTE — PATIENT INSTRUCTIONS

## 2018-10-19 NOTE — PROGRESS NOTES
Assessment/Plan:           Problem List Items Addressed This Visit     Acne vulgaris (Chronic)    Relevant Medications    adapalene (DIFFERIN) 0 1 % gel    Mixed hyperlipidemia (Chronic)    Relevant Medications    atorvastatin (LIPITOR) 20 mg tablet    Prediabetes (Chronic)    Relevant Medications    metFORMIN (GLUCOPHAGE) 500 mg tablet    Asthma due to environmental allergies (Chronic)    Relevant Medications    azelastine (ASTELIN) 0 1 % nasal spray    Vitamin D deficiency (Chronic)    Relevant Medications    ergocalciferol (VITAMIN D2) 50,000 units      Other Visit Diagnoses     Encounter for immunization    -  Primary    Relevant Orders    SYRINGE/SINGLE-DOSE VIAL: influenza vaccine, [id number], quadrivalent, 0 5 mL, preservative-free, for patients 3+ yr (FLUZONE) (Completed)    TDAP VACCINE GREATER THAN OR EQUAL TO 8YO IM (Completed)    Acute bronchitis due to other specified organisms        Relevant Medications    azelastine (ASTELIN) 0 1 % nasal spray    azithromycin (ZITHROMAX) 250 mg tablet    Abrasion of forehead, initial encounter        Relevant Medications    adapalene (DIFFERIN) 0 1 % gel    Other Relevant Orders    TDAP VACCINE GREATER THAN OR EQUAL TO 8YO IM (Completed)            Subjective:      Patient ID: Lio Barrow is a 34 y o  male  78-year-old male with history of Down syndrome, prediabetes, obesity, hyperlipidemia, allergic rhinitis with mild intermittent asthma presents today for follow-up of his chronic conditions  He has been doing well without any significant problems since his last visit  Denies any headaches, dizziness, weakness, chest pain, shortness of breath, abdominal pain, nausea, rashes, bowel or bladder changes  He is taking all his medications as prescribed          The following portions of the patient's history were reviewed and updated as appropriate: allergies, current medications, past family history, past medical history, past social history, past surgical history and problem list     Review of Systems   Constitutional: Negative for appetite change and unexpected weight change  Eyes: Negative for visual disturbance  Respiratory: Negative for chest tightness and shortness of breath  Cardiovascular: Negative for chest pain, palpitations and leg swelling  Genitourinary: Negative for frequency  Skin: Negative for color change  Neurological: Negative for dizziness  Objective:      /70   Pulse 88   Temp 97 6 °F (36 4 °C) (Tympanic)   Resp 18   Wt 91 6 kg (202 lb)   SpO2 98%   BMI 34 67 kg/m²          Physical Exam   Constitutional: He appears well-developed and well-nourished  No distress  HENT:   Head: Normocephalic and atraumatic  Neck: Normal range of motion  Neck supple  Carotid bruit is not present  No edema present  Cardiovascular: Normal rate, regular rhythm and normal heart sounds  Pulmonary/Chest: Effort normal and breath sounds normal  He has no wheezes  He has no rales  Neurological: He is alert  Psychiatric: He has a normal mood and affect   His behavior is normal  Judgment and thought content normal

## 2018-10-31 ENCOUNTER — TELEPHONE (OUTPATIENT)
Dept: FAMILY MEDICINE CLINIC | Facility: CLINIC | Age: 38
End: 2018-10-31

## 2018-11-01 DIAGNOSIS — R73.03 PREDIABETES: Chronic | ICD-10-CM

## 2018-11-01 DIAGNOSIS — E55.9 VITAMIN D DEFICIENCY: Chronic | ICD-10-CM

## 2018-11-01 DIAGNOSIS — D81.9 COMBINED IMMUNITY DEFICIENCY (HCC): ICD-10-CM

## 2018-11-01 DIAGNOSIS — J45.909 ASTHMA DUE TO ENVIRONMENTAL ALLERGIES: Primary | Chronic | ICD-10-CM

## 2018-11-01 DIAGNOSIS — E78.2 MIXED HYPERLIPIDEMIA: Chronic | ICD-10-CM

## 2018-11-01 RX ORDER — FEXOFENADINE HCL 180 MG/1
180 TABLET ORAL DAILY
Qty: 90 TABLET | Refills: 1 | Status: SHIPPED | OUTPATIENT
Start: 2018-11-01

## 2018-11-01 RX ORDER — MULTIVIT WITH MINERALS/LUTEIN
1000 TABLET ORAL WEEKLY
Qty: 90 TABLET | Refills: 2 | Status: SHIPPED | OUTPATIENT
Start: 2018-11-01 | End: 2019-06-27

## 2018-11-01 RX ORDER — ERGOCALCIFEROL 1.25 MG/1
50000 CAPSULE ORAL WEEKLY
Qty: 12 CAPSULE | Refills: 3 | Status: SHIPPED | OUTPATIENT
Start: 2018-11-01 | End: 2019-06-27 | Stop reason: SDUPTHER

## 2018-11-01 RX ORDER — AZELASTINE 1 MG/ML
2 SPRAY, METERED NASAL 2 TIMES DAILY
Qty: 90 ML | Refills: 2 | Status: SHIPPED | OUTPATIENT
Start: 2018-11-01

## 2018-11-01 RX ORDER — ATORVASTATIN CALCIUM 20 MG/1
20 TABLET, FILM COATED ORAL DAILY
Qty: 90 TABLET | Refills: 2 | Status: SHIPPED | OUTPATIENT
Start: 2018-11-01 | End: 2019-02-15 | Stop reason: SDUPTHER

## 2018-11-01 RX ORDER — OMEGA-3-ACID ETHYL ESTERS 1 G/1
1 CAPSULE, LIQUID FILLED ORAL WEEKLY
Qty: 90 CAPSULE | Refills: 2 | Status: SHIPPED | OUTPATIENT
Start: 2018-11-01 | End: 2019-11-07 | Stop reason: SDUPTHER

## 2019-02-15 ENCOUNTER — OFFICE VISIT (OUTPATIENT)
Dept: INTERNAL MEDICINE CLINIC | Facility: CLINIC | Age: 39
End: 2019-02-15
Payer: COMMERCIAL

## 2019-02-15 VITALS
HEIGHT: 63 IN | SYSTOLIC BLOOD PRESSURE: 116 MMHG | HEART RATE: 94 BPM | BODY MASS INDEX: 35.61 KG/M2 | WEIGHT: 201 LBS | OXYGEN SATURATION: 97 % | DIASTOLIC BLOOD PRESSURE: 78 MMHG

## 2019-02-15 DIAGNOSIS — E66.9 OBESITY (BMI 30-39.9): ICD-10-CM

## 2019-02-15 DIAGNOSIS — E78.2 MIXED HYPERLIPIDEMIA: Primary | Chronic | ICD-10-CM

## 2019-02-15 DIAGNOSIS — R73.03 PREDIABETES: Chronic | ICD-10-CM

## 2019-02-15 DIAGNOSIS — Z11.1 SCREENING FOR TUBERCULOSIS: ICD-10-CM

## 2019-02-15 DIAGNOSIS — Q90.9 DOWN'S SYNDROME: ICD-10-CM

## 2019-02-15 DIAGNOSIS — E55.9 VITAMIN D DEFICIENCY: Chronic | ICD-10-CM

## 2019-02-15 DIAGNOSIS — J30.1 SEASONAL ALLERGIC RHINITIS DUE TO POLLEN: ICD-10-CM

## 2019-02-15 PROBLEM — J45.909 ASTHMA DUE TO ENVIRONMENTAL ALLERGIES: Chronic | Status: RESOLVED | Noted: 2018-10-19 | Resolved: 2019-02-15

## 2019-02-15 PROBLEM — R73.01 IMPAIRED FASTING GLUCOSE: Chronic | Status: RESOLVED | Noted: 2018-10-19 | Resolved: 2019-02-15

## 2019-02-15 PROCEDURE — 86580 TB INTRADERMAL TEST: CPT | Performed by: INTERNAL MEDICINE

## 2019-02-15 PROCEDURE — 1036F TOBACCO NON-USER: CPT | Performed by: INTERNAL MEDICINE

## 2019-02-15 PROCEDURE — 3008F BODY MASS INDEX DOCD: CPT | Performed by: INTERNAL MEDICINE

## 2019-02-15 PROCEDURE — 99214 OFFICE O/P EST MOD 30 MIN: CPT | Performed by: INTERNAL MEDICINE

## 2019-02-15 PROCEDURE — 3725F SCREEN DEPRESSION PERFORMED: CPT | Performed by: INTERNAL MEDICINE

## 2019-02-15 RX ORDER — ATORVASTATIN CALCIUM 20 MG/1
20 TABLET, FILM COATED ORAL DAILY
Qty: 90 TABLET | Refills: 3 | Status: SHIPPED | OUTPATIENT
Start: 2019-02-15 | End: 2019-06-27 | Stop reason: SDUPTHER

## 2019-02-15 NOTE — PROGRESS NOTES
INTERNAL MEDICINE OFFICE VISIT  [facility] [address]  Tel: [phone]      NAME: Lio Barrow  AGE: 34 y o  SEX: male  : 1980   MRN: [id number]    DATE: 2/15/2019  TIME: 9:19 AM      Assessment and Plan:  1  Mixed hyperlipidemia  Continue atorvastatin and follow-up lipid profile     - atorvastatin (LIPITOR) 20 mg tablet; Take 1 tablet (20 mg total) by mouth daily  Dispense: 90 tablet; Refill: 3  - CBC and differential; Future  - Comprehensive metabolic panel; Future  - Lipid panel; Future  - TSH, 3rd generation; Future  - Urinalysis with microscopic    2  Prediabetes  Patient was told to hold off on the metformin till I get results of the hemoglobin A1c  He was however told to cut down on the carbohydrate intake in his diet  - Hemoglobin A1C; Future    3  Seasonal allergic rhinitis due to pollen  Continue medications    4  Down's syndrome  Stable    5  Vitamin D deficiency  Continue vitamin-D supplement, follow-up vitamin-D levels  - Vitamin D 25 hydroxy; Future    6  Obesity (BMI 30-39  9)  BMI Counseling: Body mass index is 35 61 kg/m²  Discussed the patient's BMI with him  The BMI is above average  BMI counseling and education was provided to the patient  Nutrition recommendations include reducing portion sizes, decreasing overall calorie intake and moderation in carbohydrate intake  Exercise recommendations include moderate aerobic physical activity for 150 minutes/week   heb  b  7  Screening for tuberculosis    - TB Skin Test      - Counseling Documentation: patient was counseled regarding: diagnostic results, instructions for management, risk factor reductions, prognosis, patient and family education, impressions, risks and benefits of treatment options and importance of compliance with treatment  - Medication Side Effects: Adverse side effects of medications were reviewed with the patient/guardian today        Return for follow up visit in 4 months or earlier, if needed  Chief Complaint:  Chief Complaint   Patient presents with    [facility]         History of Present Illness: This is a new patient was here to establish  Hyperlipidemia-has been taking atorvastatin for a long time  His last lipid profile about 4 months ago was within normal limits  Pre diabetes-his last hemoglobin A1c was 5 8 but he was told to take metformin by his previous PCP  Seasonal allergies-stable  Down syndrome-stable  Does not talk much and has his mother in the room as   Vitamin-D deficiency-he has been deficient in vitamin-D for a long time and is taking the supplement every week  Obesity-he does not watch his diet and has been gaining weight  Active Problem List:  Patient Active Problem List   Diagnosis    Acne vulgaris    Rectal bleeding    Mixed hyperlipidemia    Prediabetes    Down's syndrome    Vitamin D deficiency    Seasonal allergic rhinitis due to pollen    Obesity (BMI 30-39  9)         Past Medical History:  Past Medical History:   Diagnosis Date    Acne     Acne     Acne vulgaris     Allergic     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[address] )     Asthma due to environmental allergies 10/19/2018    Complete trisomy 21 syndrome     Development delay     Disturbance in speech     Down syndrome     Down's syndrome     Hyperlipidemia     Impacted cerumen     Impaired fasting glucose     Obesity     Rectal bleeding     Speech delays     Vitamin D deficiency          Past Surgical History:  Past Surgical History:   Procedure Laterality Date    NC COLONOSCOPY FLX DX W/COLLJ SPEC WHEN PFRMD N/A 8/21/2018    Procedure: COLONOSCOPY;  Surgeon: Adan Morrissey MD;  Location: [address] [place] [address] GI LAB;   Service: Colorectal    TONSILLECTOMY  1981    TYMPANOSTOMY  1981         Family History:  Family History   Problem Relation Age of Onset    Diabetes Mother     Hyperlipidemia Mother          Social History:  Social History Socioeconomic History    Marital status: Single     Spouse name: None    Number of children: None    Years of education: None    Highest education level: None   Occupational History    None   Social Needs    Financial resource strain: None    Food insecurity:     Worry: None     Inability: None    Transportation needs:     Medical: None     Non-medical: None   Tobacco Use    Smoking status: Never Smoker    Smokeless tobacco: Never Used    Tobacco comment: NO PASSIVE SMOKE EXPOSURE   Substance and Sexual Activity    Alcohol use: No    Drug use: No    Sexual activity: Never   Lifestyle    Physical activity:     Days per week: None     Minutes per session: None    Stress: None   Relationships    Social connections:     Talks on phone: None     Gets together: None     Attends Jewish service: None     Active member of club or organization: None     Attends meetings of clubs or organizations: None     Relationship status: None    Intimate partner violence:     Fear of current or ex partner: None     Emotionally abused: None     Physically abused: None     Forced sexual activity: None   Other Topics Concern    None   Social History Narrative    None         Allergies:  No Known Allergies      Medications:    Current Outpatient Medications:     Ascorbic Acid (VITAMIN C) 1000 MG tablet, Take 1 tablet (1,000 mg total) by mouth once a week, Disp: 90 tablet, Rfl: 2    atorvastatin (LIPITOR) 20 mg tablet, Take 1 tablet (20 mg total) by mouth daily, Disp: 90 tablet, Rfl: 3    azelastine (ASTELIN) 0 1 % nasal spray, 2 sprays into each nostril 2 (two) times a day Use in each nostril as directed, Disp: 90 mL, Rfl: 2    ergocalciferol (VITAMIN D2) 50,000 units, Take 1 capsule (50,000 Units total) by mouth once a week, Disp: 12 capsule, Rfl: 3    fexofenadine (ALLEGRA) 180 MG tablet, Take 1 tablet (180 mg total) by mouth daily, Disp: 90 tablet, Rfl: 1    metFORMIN (GLUCOPHAGE) 500 mg tablet, Take 1 tablet (500 mg total) by mouth 2 (two) times a day with meals, Disp: 180 tablet, Rfl: 2    omega-3-acid ethyl esters (LOVAZA) 1 g capsule, Take 1 capsule (1 g total) by mouth once a week (Patient not taking: Reported on 2/15/2019), Disp: 90 capsule, Rfl: 2      The following portions of the patient's history were reviewed and updated as appropriate: past medical history, past surgical history, family history, social history, allergies, current medications and active problem list       Review of Systems:  Constitutional: Denies fever, chills, weight gain, weight loss, fatigue  Eyes: Denies eye redness, eye discharge, double vision, change in visual acuity  ENT: Denies hearing loss, tinnitus, sneezing, nasal congestion, nasal discharge, sore throat   Respiratory: Denies cough, expectoration, hemoptysis, shortness of breath, wheezing  Cardiovascular: Denies chest pain, palpitations, lower extremity swelling, orthopnea, PND  Gastrointestinal: Denies abdominal pain, heartburn, nausea, vomiting, hematemesis, diarrhea, bloody stools  Genito-Urinary: Denies dysuria, frequency, difficulty in micturition, nocturia, incontinence  Musculoskeletal: Denies back pain, joint pain, muscle pain  Neurologic: Denies confusion, lightheadedness, syncope, headache, focal weakness, sensory changes, seizures  Endocrine: Denies polyuria, polydipsia, temperature intolerance  Allergy and Immunology: Denies hives, insect bite sensitivity  Hematological and Lymphatic: Denies bleeding problems, swollen glands   Psychological: Denies depression, suicidal ideation, anxiety, panic, mood swings  Dermatological: Denies pruritus, rash, skin lesion changes      Vitals:  Vitals:    02/15/19 0843   BP: 116/78   Pulse: 94   SpO2: 97%       Body mass index is 35 61 kg/m²  Weight (last 2 days)     Date/Time   Weight    02/15/19 0843   91 2 (201)                Physical Examination:  General: Patient is not in acute distress   Awake, alert, responding to commands  No weight gain or loss  Head: Normocephalic  Atraumatic  Eyes: Conjunctiva and lids with no swelling, erythema or discharge  Both pupils normal sized, round and reactive to light  Sclera nonicteric  ENT: External examination of nose and ear normal  Otoscopic examination shows translucent tympanic membranes with patent canals without erythema  Oropharynx moist with no erythema, edema, exudate or lesions  Neck: Supple  JVP not raised  Trachea midline  No masses  No thyromegaly  Lungs: No signs of increased work of breathing or respiratory distress  Bilateral bronchovascular breath sounds with no crackles or rhonchi  Chest wall: No tenderness  Cardiovascular: Normal PMI  No thrills  Regular rate and rhythm  S1 and S2 normal  No murmur, rub or gallop  Gastrointestinal: Abdomen soft, nontender  No guarding or rigidity  Liver and spleen not palpable  Bowel sounds present  Neurologic: Cranial nerves II-XII intact   Cortical functions normal  Motor system - Reflexes 2+ and symmetrical  Sensations normal  Musculoskeletal: Gait normal  No joint tenderness  Integumentary: Skin normal with no rash or lesions  Lymphatic: No palpable lymph nodes in neck, axilla or groin  Extremities: No clubbing, cyanosis, edema or varicosities  Psychological  Blunted affect      Laboratory Results:  CBC with diff:   Lab Results   Component Value Date    WBC 6 99 10/12/2018    RBC 4 74 10/12/2018    HGB 15 1 10/12/2018    HCT 44 9 10/12/2018    MCV 95 10/12/2018    MCH 31 9 10/12/2018    RDW 15 9 (H) 10/12/2018     10/12/2018       CMP:  Lab Results   Component Value Date    CREATININE 0 85 10/12/2018    CREATININE 1 02 11/10/2015    BUN 12 10/12/2018    BUN 15 11/10/2015     11/10/2015    K 4 1 10/12/2018    K 4 3 11/10/2015     10/12/2018     11/10/2015    CO2 28 10/12/2018    CO2 27 11/10/2015    GLUCOSE 90 11/10/2015    PROT 7 6 11/10/2015    ALKPHOS 76 10/12/2018    ALKPHOS 75 11/10/2015    ALT 47 10/12/2018 ALT 65 11/10/2015    AST 24 10/12/2018    AST 25 11/10/2015    BILIDIR 0 09 07/21/2017       Lab Results   Component Value Date    HGBA1C 5 8 10/12/2018    HGBA1C 5 6 11/10/2015       No results found for: TROPONINI, CKMB, CKTOTAL    Lipid Profile:   Lab Results   Component Value Date    CHOL 208 11/10/2015     Lab Results   Component Value Date    HDL 43 10/12/2018    HDL 45 08/03/2018     Lab Results   Component Value Date    LDLCALC 78 10/12/2018    LDLCALC 128 (H) 08/03/2018     Lab Results   Component Value Date    TRIG 198 (H) 10/12/2018    TRIG 164 (H) 08/03/2018         Health Maintenance:  Health Maintenance   Topic Date Due    Medicare Annual Wellness Visit (AWV)  1980    BMI: Followup Plan  05/18/1996    HEPATITIS B VACCINES (1 of 3 - Risk 3-dose series) 05/18/1997    Pneumococcal PPSV23 Highest Risk Adult (1 of 3 - PCV13) 05/18/1997    BMI: Adult  10/19/2019    Depression Screening PHQ  02/15/2020    DTaP,Tdap,and Td Vaccines (3 - Td) 10/19/2028    INFLUENZA VACCINE  Completed     Immunization History   Administered Date(s) Administered    H1N1, All Formulations 12/16/2009    INFLUENZA 10/23/2006, 11/17/2008, 09/21/2009, 10/29/2010, 10/06/2011, 10/26/2012, 09/14/2015, 11/23/2016, 11/15/2017    Influenza, injectable, quadrivalent, preservative free 0 5 mL 10/19/2018    Tdap 09/08/2008, 10/19/2018    Tuberculin Skin Test-PPD Intradermal 02/02/2013         Fran Faith MD  2/15/2019,9:19 AM

## 2019-02-15 NOTE — PROGRESS NOTES
INTERNAL MEDICINE FOLLOW-UP OFFICE VISIT  [facility]    NAME: Geovani Garcia  AGE: 34 y o  SEX: male  : 1980   MRN: [id number]    DATE: 2/15/2019  TIME: 9:18 AM    Assessment and Plan     Diagnoses and all orders for this visit:    Mixed hyperlipidemia  -     atorvastatin (LIPITOR) 20 mg tablet; Take 1 tablet (20 mg total) by mouth daily  -     CBC and differential; Future  -     Comprehensive metabolic panel; Future  -     Lipid panel; Future  -     TSH, 3rd generation; Future  -     Urinalysis with microscopic    Prediabetes  -     Hemoglobin A1C; Future    Seasonal allergic rhinitis due to pollen    Down's syndrome    Vitamin D deficiency  -     Vitamin D 25 hydroxy; Future    Obesity (BMI 30-39  9)    Screening for tuberculosis  -     TB Skin Test        {JNDISCUSSION:89954}    Return to office in: ***    Chief Complaint     Chief Complaint   Patient presents with    Establish Care       History of Present Illness     HPI    The following portions of the patient's history were reviewed and updated as appropriate: allergies, current medications, past family history, past medical history, past social history, past surgical history and problem list     Review of Systems     Review of Systems    Active Problem List     Patient Active Problem List   Diagnosis    Acne vulgaris    Rectal bleeding    Mixed hyperlipidemia    Prediabetes    Down's syndrome    Vitamin D deficiency    Seasonal allergic rhinitis due to pollen    Obesity (BMI 30-39  9)       Objective     /78   Pulse 94   Ht 5' 3" (1 6 m)   Wt 91 2 kg (201 lb)   SpO2 97%   BMI 35 61 kg/m²     Physical Exam    Pertinent Laboratory/Diagnostic Studies:  {JNAMBLABLINKS:78400}  {[id number]:31053}    Current Medications       Current Outpatient Medications:     Ascorbic Acid (VITAMIN C) 1000 MG tablet, Take 1 tablet (1,000 mg total) by mouth once a week, Disp: 90 tablet, Rfl: 2    atorvastatin (LIPITOR) 20 mg tablet, Take 1 tablet (20 mg total) by mouth daily, Disp: 90 tablet, Rfl: 3    azelastine (ASTELIN) 0 1 % nasal spray, 2 sprays into each nostril 2 (two) times a day Use in each nostril as directed, Disp: 90 mL, Rfl: 2    ergocalciferol (VITAMIN D2) 50,000 units, Take 1 capsule (50,000 Units total) by mouth once a week, Disp: 12 capsule, Rfl: 3    fexofenadine (ALLEGRA) 180 MG tablet, Take 1 tablet (180 mg total) by mouth daily, Disp: 90 tablet, Rfl: 1    metFORMIN (GLUCOPHAGE) 500 mg tablet, Take 1 tablet (500 mg total) by mouth 2 (two) times a day with meals, Disp: 180 tablet, Rfl: 2    omega-3-acid ethyl esters (LOVAZA) 1 g capsule, Take 1 capsule (1 g total) by mouth once a week (Patient not taking: Reported on 2/15/2019), Disp: 90 capsule, Rfl: 2    Health Maintenance     Health Maintenance   Topic Date Due    Medicare Annual Wellness Visit (AWV)  1980    BMI: Followup Plan  05/18/1996    HEPATITIS B VACCINES (1 of 3 - Risk 3-dose series) 05/18/1997    Pneumococcal PPSV23 Highest Risk Adult (1 of 3 - PCV13) 05/18/1997    BMI: Adult  10/19/2019    Depression Screening PHQ  02/15/2020    DTaP,Tdap,and Td Vaccines (3 - Td) 10/19/2028    INFLUENZA VACCINE  Completed     Immunization History   Administered Date(s) Administered    H1N1, All Formulations 12/16/2009    INFLUENZA 10/23/2006, 11/17/2008, 09/21/2009, 10/29/2010, 10/06/2011, 10/26/2012, 09/14/2015, 11/23/2016, 11/15/2017    Influenza, injectable, quadrivalent, preservative free 0 5 mL 10/19/2018    Tdap 09/08/2008, 10/19/2018    Tuberculin Skin Test-PPD Intradermal 02/02/2013         Serjio Em MD  [address] [facility] AT [facility] [place]

## 2019-02-18 ENCOUNTER — APPOINTMENT (OUTPATIENT)
Dept: LAB | Facility: CLINIC | Age: 39
End: 2019-02-18
Payer: COMMERCIAL

## 2019-02-18 ENCOUNTER — TELEPHONE (OUTPATIENT)
Dept: INTERNAL MEDICINE CLINIC | Facility: CLINIC | Age: 39
End: 2019-02-18

## 2019-02-18 DIAGNOSIS — E78.2 MIXED HYPERLIPIDEMIA: Chronic | ICD-10-CM

## 2019-02-18 DIAGNOSIS — E55.9 VITAMIN D DEFICIENCY: Chronic | ICD-10-CM

## 2019-02-18 DIAGNOSIS — R73.03 PREDIABETES: Chronic | ICD-10-CM

## 2019-02-18 LAB
25(OH)D3 SERPL-MCNC: 66.6 NG/ML (ref 30–100)
ALBUMIN SERPL BCP-MCNC: 3.5 G/DL (ref 3.5–5)
ALP SERPL-CCNC: 75 U/L (ref 46–116)
ALT SERPL W P-5'-P-CCNC: 43 U/L (ref 12–78)
ANION GAP SERPL CALCULATED.3IONS-SCNC: 4 MMOL/L (ref 4–13)
AST SERPL W P-5'-P-CCNC: 22 U/L (ref 5–45)
BACTERIA UR QL AUTO: NORMAL /HPF
BASOPHILS # BLD AUTO: 0.11 THOUSANDS/ΜL (ref 0–0.1)
BASOPHILS NFR BLD AUTO: 2 % (ref 0–1)
BILIRUB SERPL-MCNC: 0.35 MG/DL (ref 0.2–1)
BILIRUB UR QL STRIP: NEGATIVE
BUN SERPL-MCNC: 18 MG/DL (ref 5–25)
CALCIUM SERPL-MCNC: 8.6 MG/DL (ref 8.3–10.1)
CHLORIDE SERPL-SCNC: 104 MMOL/L (ref 100–108)
CHOLEST SERPL-MCNC: 199 MG/DL (ref 50–200)
CLARITY UR: CLEAR
CO2 SERPL-SCNC: 29 MMOL/L (ref 21–32)
COLOR UR: YELLOW
CREAT SERPL-MCNC: 0.92 MG/DL (ref 0.6–1.3)
EOSINOPHIL # BLD AUTO: 0.08 THOUSAND/ΜL (ref 0–0.61)
EOSINOPHIL NFR BLD AUTO: 2 % (ref 0–6)
ERYTHROCYTE [DISTWIDTH] IN BLOOD BY AUTOMATED COUNT: 16.8 % (ref 11.6–15.1)
EST. AVERAGE GLUCOSE BLD GHB EST-MCNC: 123 MG/DL
GFR SERPL CREATININE-BSD FRML MDRD: 104 ML/MIN/1.73SQ M
GLUCOSE P FAST SERPL-MCNC: 84 MG/DL (ref 65–99)
GLUCOSE UR STRIP-MCNC: NEGATIVE MG/DL
HBA1C MFR BLD: 5.9 % (ref 4.2–6.3)
HCT VFR BLD AUTO: 46.7 % (ref 36.5–49.3)
HDLC SERPL-MCNC: 45 MG/DL (ref 40–60)
HGB BLD-MCNC: 15.7 G/DL (ref 12–17)
HGB UR QL STRIP.AUTO: NEGATIVE
HYALINE CASTS #/AREA URNS LPF: NORMAL /LPF
IMM GRANULOCYTES # BLD AUTO: 0.02 THOUSAND/UL (ref 0–0.2)
IMM GRANULOCYTES NFR BLD AUTO: 0 % (ref 0–2)
INDURATION: 0 MM
KETONES UR STRIP-MCNC: NEGATIVE MG/DL
LDLC SERPL CALC-MCNC: 119 MG/DL (ref 0–100)
LEUKOCYTE ESTERASE UR QL STRIP: NEGATIVE
LYMPHOCYTES # BLD AUTO: 2.16 THOUSANDS/ΜL (ref 0.6–4.47)
LYMPHOCYTES NFR BLD AUTO: 40 % (ref 14–44)
MCH RBC QN AUTO: 31.9 PG (ref 26.8–34.3)
MCHC RBC AUTO-ENTMCNC: 33.6 G/DL (ref 31.4–37.4)
MCV RBC AUTO: 95 FL (ref 82–98)
MONOCYTES # BLD AUTO: 0.49 THOUSAND/ΜL (ref 0.17–1.22)
MONOCYTES NFR BLD AUTO: 9 % (ref 4–12)
NEUTROPHILS # BLD AUTO: 2.57 THOUSANDS/ΜL (ref 1.85–7.62)
NEUTS SEG NFR BLD AUTO: 47 % (ref 43–75)
NITRITE UR QL STRIP: NEGATIVE
NON-SQ EPI CELLS URNS QL MICRO: NORMAL /HPF
NONHDLC SERPL-MCNC: 154 MG/DL
NRBC BLD AUTO-RTO: 0 /100 WBCS
PH UR STRIP.AUTO: 6.5 [PH] (ref 4.5–8)
PLATELET # BLD AUTO: 361 THOUSANDS/UL (ref 149–390)
PMV BLD AUTO: 9.4 FL (ref 8.9–12.7)
POTASSIUM SERPL-SCNC: 4.4 MMOL/L (ref 3.5–5.3)
PROT SERPL-MCNC: 7.9 G/DL (ref 6.4–8.2)
PROT UR STRIP-MCNC: NEGATIVE MG/DL
RBC # BLD AUTO: 4.92 MILLION/UL (ref 3.88–5.62)
RBC #/AREA URNS AUTO: NORMAL /HPF
SODIUM SERPL-SCNC: 137 MMOL/L (ref 136–145)
SP GR UR STRIP.AUTO: 1.02 (ref 1–1.03)
TB SKIN TEST: NEGATIVE
TRIGL SERPL-MCNC: 173 MG/DL
TSH SERPL DL<=0.05 MIU/L-ACNC: 2.19 UIU/ML (ref 0.36–3.74)
UROBILINOGEN UR QL STRIP.AUTO: 0.2 E.U./DL
WBC # BLD AUTO: 5.43 THOUSAND/UL (ref 4.31–10.16)
WBC #/AREA URNS AUTO: NORMAL /HPF

## 2019-02-18 PROCEDURE — 80061 LIPID PANEL: CPT

## 2019-02-18 PROCEDURE — 83036 HEMOGLOBIN GLYCOSYLATED A1C: CPT

## 2019-02-18 PROCEDURE — 85025 COMPLETE CBC W/AUTO DIFF WBC: CPT

## 2019-02-18 PROCEDURE — 84443 ASSAY THYROID STIM HORMONE: CPT

## 2019-02-18 PROCEDURE — 81001 URINALYSIS AUTO W/SCOPE: CPT | Performed by: INTERNAL MEDICINE

## 2019-02-18 PROCEDURE — 80053 COMPREHEN METABOLIC PANEL: CPT

## 2019-02-18 PROCEDURE — 36415 COLL VENOUS BLD VENIPUNCTURE: CPT

## 2019-02-18 PROCEDURE — 82306 VITAMIN D 25 HYDROXY: CPT

## 2019-02-18 NOTE — TELEPHONE ENCOUNTER
----- Message from Javier Larkin MD sent at 2/18/2019  5:42 PM EST -----  TB test and urine are negative

## 2019-04-22 ENCOUNTER — TELEPHONE (OUTPATIENT)
Dept: INTERNAL MEDICINE CLINIC | Facility: CLINIC | Age: 39
End: 2019-04-22

## 2019-06-14 ENCOUNTER — PATIENT OUTREACH (OUTPATIENT)
Dept: CASE MANAGEMENT | Facility: OTHER | Age: 39
End: 2019-06-14

## 2019-06-21 ENCOUNTER — TELEPHONE (OUTPATIENT)
Dept: INTERNAL MEDICINE CLINIC | Facility: CLINIC | Age: 39
End: 2019-06-21

## 2019-06-21 DIAGNOSIS — E55.9 VITAMIN D DEFICIENCY: ICD-10-CM

## 2019-06-21 DIAGNOSIS — E78.2 MIXED HYPERLIPIDEMIA: Primary | ICD-10-CM

## 2019-06-21 DIAGNOSIS — R73.03 PREDIABETES: ICD-10-CM

## 2019-06-22 ENCOUNTER — APPOINTMENT (OUTPATIENT)
Dept: LAB | Facility: CLINIC | Age: 39
End: 2019-06-22
Payer: COMMERCIAL

## 2019-06-22 DIAGNOSIS — E55.9 VITAMIN D DEFICIENCY: ICD-10-CM

## 2019-06-22 DIAGNOSIS — E78.2 MIXED HYPERLIPIDEMIA: ICD-10-CM

## 2019-06-22 DIAGNOSIS — R73.03 PREDIABETES: ICD-10-CM

## 2019-06-22 LAB
25(OH)D3 SERPL-MCNC: 50.5 NG/ML (ref 30–100)
ANION GAP SERPL CALCULATED.3IONS-SCNC: 5 MMOL/L (ref 4–13)
BASOPHILS # BLD AUTO: 0.16 THOUSANDS/ΜL (ref 0–0.1)
BASOPHILS NFR BLD AUTO: 2 % (ref 0–1)
BUN SERPL-MCNC: 12 MG/DL (ref 5–25)
CALCIUM SERPL-MCNC: 8.8 MG/DL (ref 8.3–10.1)
CHLORIDE SERPL-SCNC: 107 MMOL/L (ref 100–108)
CHOLEST SERPL-MCNC: 153 MG/DL (ref 50–200)
CO2 SERPL-SCNC: 30 MMOL/L (ref 21–32)
CREAT SERPL-MCNC: 0.92 MG/DL (ref 0.6–1.3)
EOSINOPHIL # BLD AUTO: 0.25 THOUSAND/ΜL (ref 0–0.61)
EOSINOPHIL NFR BLD AUTO: 4 % (ref 0–6)
ERYTHROCYTE [DISTWIDTH] IN BLOOD BY AUTOMATED COUNT: 16.3 % (ref 11.6–15.1)
EST. AVERAGE GLUCOSE BLD GHB EST-MCNC: 123 MG/DL
GFR SERPL CREATININE-BSD FRML MDRD: 103 ML/MIN/1.73SQ M
GLUCOSE P FAST SERPL-MCNC: 92 MG/DL (ref 65–99)
HBA1C MFR BLD: 5.9 % (ref 4.2–6.3)
HCT VFR BLD AUTO: 44.7 % (ref 36.5–49.3)
HDLC SERPL-MCNC: 55 MG/DL (ref 40–60)
HGB BLD-MCNC: 14.8 G/DL (ref 12–17)
IMM GRANULOCYTES # BLD AUTO: 0.03 THOUSAND/UL (ref 0–0.2)
IMM GRANULOCYTES NFR BLD AUTO: 0 % (ref 0–2)
LDLC SERPL CALC-MCNC: 78 MG/DL (ref 0–100)
LYMPHOCYTES # BLD AUTO: 2.16 THOUSANDS/ΜL (ref 0.6–4.47)
LYMPHOCYTES NFR BLD AUTO: 32 % (ref 14–44)
MCH RBC QN AUTO: 31.7 PG (ref 26.8–34.3)
MCHC RBC AUTO-ENTMCNC: 33.1 G/DL (ref 31.4–37.4)
MCV RBC AUTO: 96 FL (ref 82–98)
MONOCYTES # BLD AUTO: 0.63 THOUSAND/ΜL (ref 0.17–1.22)
MONOCYTES NFR BLD AUTO: 9 % (ref 4–12)
NEUTROPHILS # BLD AUTO: 3.47 THOUSANDS/ΜL (ref 1.85–7.62)
NEUTS SEG NFR BLD AUTO: 53 % (ref 43–75)
NONHDLC SERPL-MCNC: 98 MG/DL
NRBC BLD AUTO-RTO: 0 /100 WBCS
PLATELET # BLD AUTO: 348 THOUSANDS/UL (ref 149–390)
PMV BLD AUTO: 9.8 FL (ref 8.9–12.7)
POTASSIUM SERPL-SCNC: 4.2 MMOL/L (ref 3.5–5.3)
RBC # BLD AUTO: 4.67 MILLION/UL (ref 3.88–5.62)
SODIUM SERPL-SCNC: 142 MMOL/L (ref 136–145)
TRIGL SERPL-MCNC: 100 MG/DL
WBC # BLD AUTO: 6.7 THOUSAND/UL (ref 4.31–10.16)

## 2019-06-22 PROCEDURE — 80061 LIPID PANEL: CPT

## 2019-06-22 PROCEDURE — 82306 VITAMIN D 25 HYDROXY: CPT

## 2019-06-22 PROCEDURE — 83036 HEMOGLOBIN GLYCOSYLATED A1C: CPT

## 2019-06-22 PROCEDURE — 36415 COLL VENOUS BLD VENIPUNCTURE: CPT

## 2019-06-22 PROCEDURE — 80048 BASIC METABOLIC PNL TOTAL CA: CPT

## 2019-06-22 PROCEDURE — 85025 COMPLETE CBC W/AUTO DIFF WBC: CPT

## 2019-06-27 ENCOUNTER — OFFICE VISIT (OUTPATIENT)
Dept: INTERNAL MEDICINE CLINIC | Facility: CLINIC | Age: 39
End: 2019-06-27
Payer: COMMERCIAL

## 2019-06-27 VITALS
BODY MASS INDEX: 37.1 KG/M2 | HEART RATE: 101 BPM | OXYGEN SATURATION: 95 % | DIASTOLIC BLOOD PRESSURE: 72 MMHG | WEIGHT: 209.4 LBS | SYSTOLIC BLOOD PRESSURE: 112 MMHG | HEIGHT: 63 IN

## 2019-06-27 DIAGNOSIS — E78.2 MIXED HYPERLIPIDEMIA: Primary | Chronic | ICD-10-CM

## 2019-06-27 DIAGNOSIS — R73.03 PREDIABETES: Chronic | ICD-10-CM

## 2019-06-27 DIAGNOSIS — G47.33 OBSTRUCTIVE SLEEP APNEA SYNDROME: ICD-10-CM

## 2019-06-27 DIAGNOSIS — Q90.9 DOWN'S SYNDROME: ICD-10-CM

## 2019-06-27 DIAGNOSIS — E55.9 VITAMIN D DEFICIENCY: Chronic | ICD-10-CM

## 2019-06-27 PROBLEM — L70.0 ACNE VULGARIS: Chronic | Status: RESOLVED | Noted: 2018-07-16 | Resolved: 2019-06-27

## 2019-06-27 PROBLEM — K62.5 RECTAL BLEEDING: Status: RESOLVED | Noted: 2018-07-16 | Resolved: 2019-06-27

## 2019-06-27 PROCEDURE — 99214 OFFICE O/P EST MOD 30 MIN: CPT | Performed by: INTERNAL MEDICINE

## 2019-06-27 PROCEDURE — 3008F BODY MASS INDEX DOCD: CPT | Performed by: INTERNAL MEDICINE

## 2019-06-27 PROCEDURE — 1036F TOBACCO NON-USER: CPT | Performed by: INTERNAL MEDICINE

## 2019-06-27 RX ORDER — ERGOCALCIFEROL 1.25 MG/1
50000 CAPSULE ORAL WEEKLY
Qty: 12 CAPSULE | Refills: 3 | Status: SHIPPED | OUTPATIENT
Start: 2019-06-27 | End: 2019-11-07 | Stop reason: SDUPTHER

## 2019-06-27 RX ORDER — ATORVASTATIN CALCIUM 20 MG/1
20 TABLET, FILM COATED ORAL DAILY
Qty: 90 TABLET | Refills: 3 | Status: SHIPPED | OUTPATIENT
Start: 2019-06-27 | End: 2019-11-07 | Stop reason: SDUPTHER

## 2019-09-13 ENCOUNTER — TELEPHONE (OUTPATIENT)
Dept: INTERNAL MEDICINE CLINIC | Facility: CLINIC | Age: 39
End: 2019-09-13

## 2019-09-13 DIAGNOSIS — G47.33 OBSTRUCTIVE SLEEP APNEA SYNDROME: Primary | ICD-10-CM

## 2019-09-13 NOTE — TELEPHONE ENCOUNTER
Pt has a sleep study scheduled at [facility] for 9/27/19, his mother Lillian Solis would like to know if this could be changed to a home study instead  Pt has down syndrome and mother does not think he will sleep at all during the test if done at the sleep lab   Please advise     cb - [phone]

## 2019-11-06 ENCOUNTER — APPOINTMENT (OUTPATIENT)
Dept: LAB | Facility: CLINIC | Age: 39
End: 2019-11-06
Payer: COMMERCIAL

## 2019-11-06 DIAGNOSIS — R73.03 PREDIABETES: Chronic | ICD-10-CM

## 2019-11-06 DIAGNOSIS — E78.2 MIXED HYPERLIPIDEMIA: Chronic | ICD-10-CM

## 2019-11-06 LAB
ALBUMIN SERPL BCP-MCNC: 3.3 G/DL (ref 3.5–5)
ALP SERPL-CCNC: 75 U/L (ref 46–116)
ALT SERPL W P-5'-P-CCNC: 46 U/L (ref 12–78)
ANION GAP SERPL CALCULATED.3IONS-SCNC: 6 MMOL/L (ref 4–13)
AST SERPL W P-5'-P-CCNC: 27 U/L (ref 5–45)
BASOPHILS # BLD AUTO: 0.12 THOUSANDS/ΜL (ref 0–0.1)
BASOPHILS NFR BLD AUTO: 2 % (ref 0–1)
BILIRUB SERPL-MCNC: 0.32 MG/DL (ref 0.2–1)
BUN SERPL-MCNC: 12 MG/DL (ref 5–25)
CALCIUM SERPL-MCNC: 8.6 MG/DL (ref 8.3–10.1)
CHLORIDE SERPL-SCNC: 105 MMOL/L (ref 100–108)
CHOLEST SERPL-MCNC: 164 MG/DL (ref 50–200)
CO2 SERPL-SCNC: 28 MMOL/L (ref 21–32)
CREAT SERPL-MCNC: 0.9 MG/DL (ref 0.6–1.3)
EOSINOPHIL # BLD AUTO: 0.11 THOUSAND/ΜL (ref 0–0.61)
EOSINOPHIL NFR BLD AUTO: 2 % (ref 0–6)
ERYTHROCYTE [DISTWIDTH] IN BLOOD BY AUTOMATED COUNT: 15.9 % (ref 11.6–15.1)
EST. AVERAGE GLUCOSE BLD GHB EST-MCNC: 126 MG/DL
GFR SERPL CREATININE-BSD FRML MDRD: 106 ML/MIN/1.73SQ M
GLUCOSE P FAST SERPL-MCNC: 87 MG/DL (ref 65–99)
HBA1C MFR BLD: 6 % (ref 4.2–6.3)
HCT VFR BLD AUTO: 46.1 % (ref 36.5–49.3)
HDLC SERPL-MCNC: 46 MG/DL
HGB BLD-MCNC: 15.3 G/DL (ref 12–17)
IMM GRANULOCYTES # BLD AUTO: 0.08 THOUSAND/UL (ref 0–0.2)
IMM GRANULOCYTES NFR BLD AUTO: 1 % (ref 0–2)
LDLC SERPL CALC-MCNC: 94 MG/DL (ref 0–100)
LYMPHOCYTES # BLD AUTO: 2.01 THOUSANDS/ΜL (ref 0.6–4.47)
LYMPHOCYTES NFR BLD AUTO: 32 % (ref 14–44)
MCH RBC QN AUTO: 31.3 PG (ref 26.8–34.3)
MCHC RBC AUTO-ENTMCNC: 33.2 G/DL (ref 31.4–37.4)
MCV RBC AUTO: 94 FL (ref 82–98)
MONOCYTES # BLD AUTO: 0.53 THOUSAND/ΜL (ref 0.17–1.22)
MONOCYTES NFR BLD AUTO: 9 % (ref 4–12)
NEUTROPHILS # BLD AUTO: 3.37 THOUSANDS/ΜL (ref 1.85–7.62)
NEUTS SEG NFR BLD AUTO: 54 % (ref 43–75)
NONHDLC SERPL-MCNC: 118 MG/DL
NRBC BLD AUTO-RTO: 0 /100 WBCS
PLATELET # BLD AUTO: 373 THOUSANDS/UL (ref 149–390)
PMV BLD AUTO: 9.3 FL (ref 8.9–12.7)
POTASSIUM SERPL-SCNC: 4.4 MMOL/L (ref 3.5–5.3)
PROT SERPL-MCNC: 7.7 G/DL (ref 6.4–8.2)
RBC # BLD AUTO: 4.89 MILLION/UL (ref 3.88–5.62)
SODIUM SERPL-SCNC: 139 MMOL/L (ref 136–145)
TRIGL SERPL-MCNC: 120 MG/DL
TSH SERPL DL<=0.05 MIU/L-ACNC: 1.55 UIU/ML (ref 0.36–3.74)
WBC # BLD AUTO: 6.22 THOUSAND/UL (ref 4.31–10.16)

## 2019-11-06 PROCEDURE — 83036 HEMOGLOBIN GLYCOSYLATED A1C: CPT

## 2019-11-06 PROCEDURE — 84443 ASSAY THYROID STIM HORMONE: CPT

## 2019-11-06 PROCEDURE — 36415 COLL VENOUS BLD VENIPUNCTURE: CPT

## 2019-11-06 PROCEDURE — 80061 LIPID PANEL: CPT

## 2019-11-06 PROCEDURE — 80053 COMPREHEN METABOLIC PANEL: CPT

## 2019-11-06 PROCEDURE — 85025 COMPLETE CBC W/AUTO DIFF WBC: CPT

## 2019-11-07 ENCOUNTER — OFFICE VISIT (OUTPATIENT)
Dept: INTERNAL MEDICINE CLINIC | Facility: CLINIC | Age: 39
End: 2019-11-07
Payer: COMMERCIAL

## 2019-11-07 VITALS
HEART RATE: 88 BPM | WEIGHT: 209.4 LBS | SYSTOLIC BLOOD PRESSURE: 118 MMHG | DIASTOLIC BLOOD PRESSURE: 82 MMHG | OXYGEN SATURATION: 94 % | BODY MASS INDEX: 37.09 KG/M2

## 2019-11-07 DIAGNOSIS — E55.9 VITAMIN D DEFICIENCY: Chronic | ICD-10-CM

## 2019-11-07 DIAGNOSIS — E78.2 MIXED HYPERLIPIDEMIA: Chronic | ICD-10-CM

## 2019-11-07 DIAGNOSIS — R73.03 PREDIABETES: Primary | Chronic | ICD-10-CM

## 2019-11-07 DIAGNOSIS — J30.1 SEASONAL ALLERGIC RHINITIS DUE TO POLLEN: ICD-10-CM

## 2019-11-07 DIAGNOSIS — Q90.9 DOWN'S SYNDROME: ICD-10-CM

## 2019-11-07 DIAGNOSIS — Z23 NEED FOR INFLUENZA VACCINATION: ICD-10-CM

## 2019-11-07 PROCEDURE — G0008 ADMIN INFLUENZA VIRUS VAC: HCPCS | Performed by: INTERNAL MEDICINE

## 2019-11-07 PROCEDURE — 90686 IIV4 VACC NO PRSV 0.5 ML IM: CPT | Performed by: INTERNAL MEDICINE

## 2019-11-07 PROCEDURE — 99214 OFFICE O/P EST MOD 30 MIN: CPT | Performed by: INTERNAL MEDICINE

## 2019-11-07 RX ORDER — ERGOCALCIFEROL 1.25 MG/1
50000 CAPSULE ORAL WEEKLY
Qty: 12 CAPSULE | Refills: 3 | Status: SHIPPED | OUTPATIENT
Start: 2019-11-07 | End: 2020-09-29 | Stop reason: SDUPTHER

## 2019-11-07 RX ORDER — TRETINOIN 0.25 MG/G
GEL TOPICAL DAILY
COMMUNITY
Start: 2019-10-28

## 2019-11-07 RX ORDER — OMEGA-3-ACID ETHYL ESTERS 1 G/1
1 CAPSULE, LIQUID FILLED ORAL WEEKLY
Qty: 90 CAPSULE | Refills: 2 | Status: SHIPPED | OUTPATIENT
Start: 2019-11-07 | End: 2019-11-08 | Stop reason: SDUPTHER

## 2019-11-07 RX ORDER — ADAPALENE 45 G/G
GEL TOPICAL DAILY
COMMUNITY
Start: 2019-10-07

## 2019-11-07 RX ORDER — ATORVASTATIN CALCIUM 20 MG/1
20 TABLET, FILM COATED ORAL DAILY
Qty: 90 TABLET | Refills: 3 | Status: SHIPPED | OUTPATIENT
Start: 2019-11-07 | End: 2020-12-22

## 2019-11-07 NOTE — PROGRESS NOTES
INTERNAL MEDICINE OFFICE VISIT  [facility] AT [address], 0 [facility]  Tel: [phone]      NAME: Juliane Mack  AGE: 37 y o  SEX: male  : 1980   MRN: [id number]    DATE: 2019  TIME: 9:22 AM      Assessment and Plan:  1  Mixed hyperlipidemia  Continue atorvastatin  - omega-3-acid ethyl esters (LOVAZA) 1 g capsule; Take 1 capsule (1 g total) by mouth once a week  Dispense: 90 capsule; Refill: 2  - atorvastatin (LIPITOR) 20 mg tablet; Take 1 tablet (20 mg total) by mouth daily  Dispense: 90 tablet; Refill: 3  - CBC and differential; Future  - Comprehensive metabolic panel; Future  - Lipid panel; Future  - Urinalysis with microscopic; Future    2  Vitamin D deficiency   continue vitamin-D supplement  - ergocalciferol (VITAMIN D2) 50,000 units; Take 1 capsule (50,000 Units total) by mouth once a week  Dispense: 12 capsule; Refill: 3  - Vitamin D 25 hydroxy; Future    3  Prediabetes   he was advised to cut down on the carbohydrate  - Hemoglobin A1C; Future    4  Seasonal allergic rhinitis due to pollen   continue Allegra    5  Down's syndrome   stable      - Counseling Documentation: patient was counseled regarding: diagnostic results, instructions for management, risk factor reductions, prognosis, patient and family education, impressions, risks and benefits of treatment options and importance of compliance with treatment  - Medication Side Effects: Adverse side effects of medications were reviewed with the patient/guardian today  Return for follow up visit in  4 months or earlier, if needed        Chief Complaint:  Chief Complaint   Patient presents with    Follow-up     4 month         History of Present Illness:    hyperlipidemia - cholesterol is under control with the atorvastatin  Vitamin-D deficiency -takes supplement regularly  Pre diabetes-his hemoglobin A1c is slowly increasing and now it is up to 6  Seasonal allergies - stable on Allegra  Down syndrome-stable      Active Problem List:  Patient Active Problem List   Diagnosis    Mixed hyperlipidemia    Prediabetes    Down's syndrome    Vitamin D deficiency    Seasonal allergic rhinitis due to pollen    Obesity (BMI 30-39  9)    Disturbance in speech         Past Medical History:  Past Medical History:   Diagnosis Date    Acne     Acne     Acne vulgaris     Allergic     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[address] )     Asthma due to environmental allergies 10/19/2018    Complete trisomy 21 syndrome     Development delay     Disturbance in speech     Down syndrome     Down's syndrome     Hyperlipidemia     Impacted cerumen     Impaired fasting glucose     Obesity     Rectal bleeding     Speech delays     Vitamin D deficiency          Past Surgical History:  Past Surgical History:   Procedure Laterality Date    AR COLONOSCOPY FLX DX W/COLLJ SPEC WHEN PFRMD N/A 8/21/2018    Procedure: COLONOSCOPY;  Surgeon: Cathie Her MD;  Location: [address] [place] [address] GI LAB;   Service: Colorectal    TONSILLECTOMY  1981    TYMPANOSTOMY  1981         Family History:  Family History   Problem Relation Age of Onset    Diabetes Mother     Hyperlipidemia Mother          Social History:  Social History     Socioeconomic History    Marital status: Single     Spouse name: None    Number of children: None    Years of education: None    Highest education level: None   Occupational History    None   Social Needs    Financial resource strain: None    Food insecurity:     Worry: None     Inability: None    Transportation needs:     Medical: None     Non-medical: None   Tobacco Use    Smoking status: Never Smoker    Smokeless tobacco: Never Used    Tobacco comment: NO PASSIVE SMOKE EXPOSURE   Substance and Sexual Activity    Alcohol use: No    Drug use: No    Sexual activity: Never   Lifestyle    Physical activity:     Days per week: 0 days     Minutes per session: 0 min    Stress: Patient refused Relationships    Social connections:     Talks on phone: None     Gets together: None     Attends Jewish service: None     Active member of club or organization: None     Attends meetings of clubs or organizations: None     Relationship status: None    Intimate partner violence:     Fear of current or ex partner: None     Emotionally abused: None     Physically abused: None     Forced sexual activity: None   Other Topics Concern    None   Social History Narrative    None         Allergies:  No Known Allergies      Medications:    Current Outpatient Medications:     adapalene (DIFFERIN) 0 1 % gel, daily, Disp: , Rfl:     atorvastatin (LIPITOR) 20 mg tablet, Take 1 tablet (20 mg total) by mouth daily, Disp: 90 tablet, Rfl: 3    azelastine (ASTELIN) 0 1 % nasal spray, 2 sprays into each nostril 2 (two) times a day Use in each nostril as directed, Disp: 90 mL, Rfl: 2    ergocalciferol (VITAMIN D2) 50,000 units, Take 1 capsule (50,000 Units total) by mouth once a week, Disp: 12 capsule, Rfl: 3    fexofenadine (ALLEGRA) 180 MG tablet, Take 1 tablet (180 mg total) by mouth daily, Disp: 90 tablet, Rfl: 1    metFORMIN (GLUCOPHAGE) 500 mg tablet, Take 1 tablet (500 mg total) by mouth 2 (two) times a day with meals (Patient taking differently: Take 500 mg by mouth daily with breakfast ), Disp: 180 tablet, Rfl: 3    omega-3-acid ethyl esters (LOVAZA) 1 g capsule, Take 1 capsule (1 g total) by mouth once a week, Disp: 90 capsule, Rfl: 2    tretinoin (RETIN-A) 0 025 % gel, daily, Disp: , Rfl:       The following portions of the patient's history were reviewed and updated as appropriate: past medical history, past surgical history, family history, social history, allergies, current medications and active problem list       Review of Systems:  Constitutional: Denies fever, chills, weight gain, weight loss, fatigue  Eyes: Denies eye redness, eye discharge, double vision, change in visual acuity  ENT: Denies hearing loss, tinnitus, sneezing, nasal congestion, nasal discharge, sore throat   Respiratory: Denies cough, expectoration, hemoptysis, shortness of breath, wheezing  Cardiovascular: Denies chest pain, palpitations, lower extremity swelling, orthopnea, PND  Gastrointestinal: Denies abdominal pain, heartburn, nausea, vomiting, hematemesis, diarrhea, bloody stools  Genito-Urinary: Denies dysuria, frequency, difficulty in micturition, nocturia, incontinence  Musculoskeletal: Denies back pain, joint pain, muscle pain  Neurologic: Denies confusion, lightheadedness, syncope, headache, focal weakness, sensory changes, seizures  Endocrine: Denies polyuria, polydipsia, temperature intolerance  Allergy and Immunology: Denies hives, insect bite sensitivity  Hematological and Lymphatic: Denies bleeding problems, swollen glands   Psychological: Denies depression, suicidal ideation, anxiety, panic, mood swings  Dermatological: Denies pruritus, rash, skin lesion changes      Vitals:  Vitals:    11/07/19 0747   BP: 118/82   Pulse: 88   SpO2: 94%       Body mass index is 37 09 kg/m²  Weight (last 2 days)     Date/Time   Weight    11/07/19 0747   95 (209 4) w/ shoes denied off    Weight: w/ shoes denied off at 11/07/19 0747                Physical Examination:  General: Patient is not in acute distress  Awake, alert, responding to commands  No weight gain or loss  Head: Normocephalic  Atraumatic  Eyes: Conjunctiva and lids with no swelling, erythema or discharge  Both pupils normal sized, round and reactive to light  Sclera nonicteric  ENT: External examination of nose and ear normal  Otoscopic examination shows translucent tympanic membranes with patent canals without erythema  Oropharynx moist with no erythema, edema, exudate or lesions  Neck: Supple  JVP not raised  Trachea midline  No masses  No thyromegaly  Lungs: No signs of increased work of breathing or respiratory distress   Bilateral bronchovascular breath sounds with no crackles or rhonchi  Chest wall: No tenderness  Cardiovascular: Normal PMI  No thrills  Regular rate and rhythm  S1 and S2 normal  No murmur, rub or gallop  Gastrointestinal: Abdomen soft, nontender  No guarding or rigidity  Liver and spleen not palpable  Bowel sounds present  Neurologic: Cranial nerves II-XII intact   Cortical functions normal  Motor system - Reflexes 2+ and symmetrical  Sensations normal  Musculoskeletal: Gait normal  No joint tenderness  Integumentary: Skin normal with no rash or lesions  Lymphatic: No palpable lymph nodes in neck, axilla or groin  Extremities: No clubbing, cyanosis, edema or varicosities  Psychological: has Down syndrome      Laboratory Results:  CBC with diff:   Lab Results   Component Value Date    WBC 6 22 11/06/2019    RBC 4 89 11/06/2019    HGB 15 3 11/06/2019    HCT 46 1 11/06/2019    MCV 94 11/06/2019    MCH 31 3 11/06/2019    RDW 15 9 (H) 11/06/2019     11/06/2019       CMP:  Lab Results   Component Value Date    CREATININE 0 90 11/06/2019    CREATININE 1 02 11/10/2015    BUN 12 11/06/2019    BUN 15 11/10/2015     11/10/2015    K 4 4 11/06/2019    K 4 3 11/10/2015     11/06/2019     11/10/2015    CO2 28 11/06/2019    CO2 27 11/10/2015    GLUCOSE 90 11/10/2015    PROT 7 6 11/10/2015    ALKPHOS 75 11/06/2019    ALKPHOS 75 11/10/2015    ALT 46 11/06/2019    ALT 65 11/10/2015    AST 27 11/06/2019    AST 25 11/10/2015    BILIDIR 0 09 07/21/2017       Lab Results   Component Value Date    HGBA1C 6 0 11/06/2019    HGBA1C 5 6 11/10/2015       No results found for: TROPONINI, CKMB, CKTOTAL    Lipid Profile:   Lab Results   Component Value Date    CHOL 208 11/10/2015     Lab Results   Component Value Date    HDL 46 11/06/2019    HDL 55 06/22/2019     Lab Results   Component Value Date    LDLCALC 94 11/06/2019    LDLCALC 78 06/22/2019     Lab Results   Component Value Date    TRIG 120 11/06/2019    TRIG 100 06/22/2019       Imaging Results:  XR foot 2 vw right  Narrative: RIGHT FOOT    INDICATION:  Right foot pain  Swelling  Limping  COMPARISON: None    VIEWS:  AP and lateral; 2 images    FINDINGS:    There is no acute fracture or dislocation  Periarticular cyst is noted in the medial margin of the head of the 1st metatarsal     No lytic or blastic lesions are seen  Soft tissue swelling noted medial to the 1st metatarsophalangeal joint  Impression: Nonspecific arthritic changes at the 1st metatarsophalangeal joint suspicious for gout  No calcified tophus is appreciated      Workstation performed: [id number]       Health Maintenance:  Health Maintenance   Topic Date Due   [facility] Annual Wellness Visit (AWV)  1980    Pneumococcal Vaccine: Pediatrics (0 to 5 Years) and At-Risk Patients (6 to 59 Years) (1 of 3 - PCV13) 05/18/1984    INFLUENZA VACCINE  07/01/2019    Depression Screening PHQ  02/15/2020    BMI: Followup Plan  02/15/2020    BMI: Adult  11/07/2020    DTaP,Tdap,and Td Vaccines (3 - Td) 10/19/2028    Pneumococcal Vaccine: 65+ Years (1 of 2 - PCV13) 05/18/2043    HEPATITIS B VACCINES  Aged Out     Immunization History   Administered Date(s) Administered     Influenza (IM) Preservative Free 10/23/2006, 11/17/2008, 09/21/2009, 10/29/2010, 10/06/2011    Fluzone Split Quad 0 25 mL 11/15/2017    Fluzone Split Quad 0 5 mL 11/23/2016    H1N1, All Formulations 12/16/2009    INFLUENZA 10/23/2006, 11/17/2008, 09/21/2009, 10/29/2010, 10/06/2011, 10/26/2012, 09/14/2015, 11/23/2016, 11/15/2017    Influenza Split 10/24/2013    Influenza Split High Dose Preservative Free IM 12/15/2009    Influenza TIV (IM) 11/03/2014    Influenza, injectable, quadrivalent, preservative free 0 5 mL 10/19/2018, 11/07/2019    Tdap 09/08/2008, 10/19/2018    Tuberculin Skin Test-PPD Intradermal 02/02/2013, 02/15/2019         Shane Humphrey MD  11/7/2019,9:22 AM

## 2019-11-08 ENCOUNTER — TELEPHONE (OUTPATIENT)
Dept: INTERNAL MEDICINE CLINIC | Facility: CLINIC | Age: 39
End: 2019-11-08

## 2019-11-08 DIAGNOSIS — E78.2 MIXED HYPERLIPIDEMIA: Chronic | ICD-10-CM

## 2019-11-08 RX ORDER — OMEGA-3-ACID ETHYL ESTERS 1 G/1
1 CAPSULE, LIQUID FILLED ORAL DAILY
Qty: 90 CAPSULE | Refills: 2 | Status: SHIPPED | OUTPATIENT
Start: 2019-11-08 | End: 2020-10-06 | Stop reason: SDUPTHER

## 2019-11-08 NOTE — TELEPHONE ENCOUNTER
Juan F Lin from [address] [place] [address] called regarding pt's prescription for his omega 3  Directions state to take 1 capsule weekly  Pt states he was taking it once a day   Please advise      Re-send to luis felipe Ontiveros

## 2019-11-20 ENCOUNTER — HOSPITAL ENCOUNTER (OUTPATIENT)
Dept: SLEEP CENTER | Facility: CLINIC | Age: 39
Discharge: HOME | End: 2019-11-20

## 2019-11-20 DIAGNOSIS — G47.33 OBSTRUCTIVE SLEEP APNEA SYNDROME: ICD-10-CM

## 2019-11-22 NOTE — PROGRESS NOTES
Home Sleep Study Documentation    Pre-Sleep Home Study:    Set-up and instructions performed by: C  A      Technician performed demonstration for Patient: yes    Return demonstration performed by Patient: yes with patient Mother    Written instructions provided to Patient: yes    Patient signed consent form: yes        Post-Sleep Home Study:    Additional comments by Patient: Patient Mother informed MA upon home study return that the patient refused to wear the equipment and patient removed it    Home Sleep Study Failed:yes:     Failure reason: Patient did not tolerate study    Reported or Detected: reported    Scored by: Selvin Ybarra CRT, RPSGT

## 2019-12-18 ENCOUNTER — TELEPHONE (OUTPATIENT)
Dept: INTERNAL MEDICINE CLINIC | Facility: CLINIC | Age: 39
End: 2019-12-18

## 2019-12-18 NOTE — TELEPHONE ENCOUNTER
There is absolutely no need to check the blood pressure every 0 5 hour  When you take the medication, it takes about 1-2 hours to start acting  Please take the blood pressure after about 2 hours and let us know

## 2019-12-18 NOTE — TELEPHONE ENCOUNTER
SPOKE TO DR Laurie Newsome AS DR Mitra Dukes NOT IN TILL LATER AND TO RECHECK PRESSURE LATER AND CALL US BACK WITH READING , CALLED MOTHER AND LMOMTCB

## 2019-12-18 NOTE — TELEPHONE ENCOUNTER
MOTHER CALLED BACK  AND STATED SHE HAS BEEN CHECKING BP EVERY 1/2 HOUR AND LAST READING  /77 AND HE IS NOT SLEEPING OR DOPEY, DID NOT SEND HIM TO WORK  TODAY AND HE IS ACTING NORMAL

## 2019-12-18 NOTE — TELEPHONE ENCOUNTER
Mother said patient took her blood pressure medication 1/2 hr ago     Atenolol 150 mg   Quinapril - HCTZ 10-12 5    She just ck'ed his BP & it's 147/94    Mother is asking if she needs to keep ck'ing his BP throughout the day    What should she do    ?? 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

## 2020-01-21 ENCOUNTER — TELEPHONE (OUTPATIENT)
Dept: INTERNAL MEDICINE CLINIC | Facility: CLINIC | Age: 40
End: 2020-01-21

## 2020-01-21 NOTE — TELEPHONE ENCOUNTER
Patient's mom, [facility] came in and dropped off a physical therapy form  From "Lata Deal" so he can have physical therapy for maintaince/exercise  She said it was done with another doctor the patient was seeing before buy he no longer sees them  Please advise  [facility] was notified of possible fee and she's ok with it  Form in Dr Carlos rodriguez  Please call when ready to be picked up     NX#[phone]

## 2020-03-11 ENCOUNTER — APPOINTMENT (OUTPATIENT)
Dept: LAB | Facility: CLINIC | Age: 40
End: 2020-03-11
Payer: COMMERCIAL

## 2020-03-11 DIAGNOSIS — R73.03 PREDIABETES: Chronic | ICD-10-CM

## 2020-03-11 DIAGNOSIS — E55.9 VITAMIN D DEFICIENCY: Chronic | ICD-10-CM

## 2020-03-11 DIAGNOSIS — E78.2 MIXED HYPERLIPIDEMIA: Chronic | ICD-10-CM

## 2020-03-11 LAB
25(OH)D3 SERPL-MCNC: 59.1 NG/ML (ref 30–100)
ALBUMIN SERPL BCP-MCNC: 3.5 G/DL (ref 3.5–5)
ALP SERPL-CCNC: 77 U/L (ref 46–116)
ALT SERPL W P-5'-P-CCNC: 52 U/L (ref 12–78)
ANION GAP SERPL CALCULATED.3IONS-SCNC: 5 MMOL/L (ref 4–13)
AST SERPL W P-5'-P-CCNC: 27 U/L (ref 5–45)
BACTERIA UR QL AUTO: NORMAL /HPF
BASOPHILS # BLD AUTO: 0.12 THOUSANDS/ΜL (ref 0–0.1)
BASOPHILS NFR BLD AUTO: 2 % (ref 0–1)
BILIRUB SERPL-MCNC: 0.5 MG/DL (ref 0.2–1)
BILIRUB UR QL STRIP: NEGATIVE
BUN SERPL-MCNC: 14 MG/DL (ref 5–25)
CALCIUM SERPL-MCNC: 8.6 MG/DL (ref 8.3–10.1)
CHLORIDE SERPL-SCNC: 105 MMOL/L (ref 100–108)
CHOLEST SERPL-MCNC: 157 MG/DL (ref 50–200)
CLARITY UR: CLEAR
CO2 SERPL-SCNC: 27 MMOL/L (ref 21–32)
COLOR UR: YELLOW
CREAT SERPL-MCNC: 0.91 MG/DL (ref 0.6–1.3)
EOSINOPHIL # BLD AUTO: 0.11 THOUSAND/ΜL (ref 0–0.61)
EOSINOPHIL NFR BLD AUTO: 2 % (ref 0–6)
ERYTHROCYTE [DISTWIDTH] IN BLOOD BY AUTOMATED COUNT: 15.8 % (ref 11.6–15.1)
EST. AVERAGE GLUCOSE BLD GHB EST-MCNC: 117 MG/DL
GFR SERPL CREATININE-BSD FRML MDRD: 104 ML/MIN/1.73SQ M
GLUCOSE P FAST SERPL-MCNC: 98 MG/DL (ref 65–99)
GLUCOSE UR STRIP-MCNC: NEGATIVE MG/DL
HBA1C MFR BLD: 5.7 %
HCT VFR BLD AUTO: 46.6 % (ref 36.5–49.3)
HDLC SERPL-MCNC: 30 MG/DL
HGB BLD-MCNC: 15.6 G/DL (ref 12–17)
HGB UR QL STRIP.AUTO: NEGATIVE
HYALINE CASTS #/AREA URNS LPF: NORMAL /LPF
IMM GRANULOCYTES # BLD AUTO: 0.04 THOUSAND/UL (ref 0–0.2)
IMM GRANULOCYTES NFR BLD AUTO: 1 % (ref 0–2)
KETONES UR STRIP-MCNC: NEGATIVE MG/DL
LDLC SERPL CALC-MCNC: 101 MG/DL (ref 0–100)
LEUKOCYTE ESTERASE UR QL STRIP: NEGATIVE
LYMPHOCYTES # BLD AUTO: 2.15 THOUSANDS/ΜL (ref 0.6–4.47)
LYMPHOCYTES NFR BLD AUTO: 30 % (ref 14–44)
MCH RBC QN AUTO: 31.5 PG (ref 26.8–34.3)
MCHC RBC AUTO-ENTMCNC: 33.5 G/DL (ref 31.4–37.4)
MCV RBC AUTO: 94 FL (ref 82–98)
MONOCYTES # BLD AUTO: 0.53 THOUSAND/ΜL (ref 0.17–1.22)
MONOCYTES NFR BLD AUTO: 8 % (ref 4–12)
NEUTROPHILS # BLD AUTO: 4.15 THOUSANDS/ΜL (ref 1.85–7.62)
NEUTS SEG NFR BLD AUTO: 57 % (ref 43–75)
NITRITE UR QL STRIP: NEGATIVE
NON-SQ EPI CELLS URNS QL MICRO: NORMAL /HPF
NONHDLC SERPL-MCNC: 127 MG/DL
NRBC BLD AUTO-RTO: 0 /100 WBCS
PH UR STRIP.AUTO: 6.5 [PH]
PLATELET # BLD AUTO: 331 THOUSANDS/UL (ref 149–390)
PMV BLD AUTO: 9.1 FL (ref 8.9–12.7)
POTASSIUM SERPL-SCNC: 4.1 MMOL/L (ref 3.5–5.3)
PROT SERPL-MCNC: 7.5 G/DL (ref 6.4–8.2)
PROT UR STRIP-MCNC: NEGATIVE MG/DL
RBC # BLD AUTO: 4.96 MILLION/UL (ref 3.88–5.62)
RBC #/AREA URNS AUTO: NORMAL /HPF
SODIUM SERPL-SCNC: 137 MMOL/L (ref 136–145)
SP GR UR STRIP.AUTO: 1.02 (ref 1–1.03)
TRIGL SERPL-MCNC: 131 MG/DL
UROBILINOGEN UR QL STRIP.AUTO: 0.2 E.U./DL
WBC # BLD AUTO: 7.1 THOUSAND/UL (ref 4.31–10.16)
WBC #/AREA URNS AUTO: NORMAL /HPF

## 2020-03-11 PROCEDURE — 82306 VITAMIN D 25 HYDROXY: CPT

## 2020-03-11 PROCEDURE — 80053 COMPREHEN METABOLIC PANEL: CPT

## 2020-03-11 PROCEDURE — 80061 LIPID PANEL: CPT

## 2020-03-11 PROCEDURE — 81001 URINALYSIS AUTO W/SCOPE: CPT

## 2020-03-11 PROCEDURE — 36415 COLL VENOUS BLD VENIPUNCTURE: CPT

## 2020-03-11 PROCEDURE — 85025 COMPLETE CBC W/AUTO DIFF WBC: CPT

## 2020-03-11 PROCEDURE — 83036 HEMOGLOBIN GLYCOSYLATED A1C: CPT

## 2020-06-17 ENCOUNTER — TELEMEDICINE (OUTPATIENT)
Dept: INTERNAL MEDICINE CLINIC | Facility: CLINIC | Age: 40
End: 2020-06-17
Payer: COMMERCIAL

## 2020-06-17 VITALS — TEMPERATURE: 97.2 F

## 2020-06-17 DIAGNOSIS — J06.9 UPPER RESPIRATORY TRACT INFECTION, UNSPECIFIED TYPE: Primary | ICD-10-CM

## 2020-06-17 DIAGNOSIS — E66.9 OBESITY (BMI 30-39.9): ICD-10-CM

## 2020-06-17 DIAGNOSIS — R73.03 PREDIABETES: Chronic | ICD-10-CM

## 2020-06-17 DIAGNOSIS — Z20.828 EXPOSURE TO SARS-ASSOCIATED CORONAVIRUS: ICD-10-CM

## 2020-06-17 PROCEDURE — U0003 INFECTIOUS AGENT DETECTION BY NUCLEIC ACID (DNA OR RNA); SEVERE ACUTE RESPIRATORY SYNDROME CORONAVIRUS 2 (SARS-COV-2) (CORONAVIRUS DISEASE [COVID-19]), AMPLIFIED PROBE TECHNIQUE, MAKING USE OF HIGH THROUGHPUT TECHNOLOGIES AS DESCRIBED BY CMS-2020-01-R: HCPCS

## 2020-06-17 PROCEDURE — 99442 PR PHYS/QHP TELEPHONE EVALUATION 11-20 MIN: CPT | Performed by: INTERNAL MEDICINE

## 2020-06-19 ENCOUNTER — TELEPHONE (OUTPATIENT)
Dept: INTERNAL MEDICINE CLINIC | Facility: CLINIC | Age: 40
End: 2020-06-19

## 2020-06-19 ENCOUNTER — TELEPHONE (OUTPATIENT)
Dept: OTHER | Facility: OTHER | Age: 40
End: 2020-06-19

## 2020-06-19 LAB — SARS-COV-2 RNA SPEC QL NAA+PROBE: NOT DETECTED

## 2020-06-26 ENCOUNTER — TELEPHONE (OUTPATIENT)
Dept: INTERNAL MEDICINE CLINIC | Facility: CLINIC | Age: 40
End: 2020-06-26

## 2020-06-26 NOTE — TELEPHONE ENCOUNTER
Patient has AWV Monday morning and mother got a message to call the office "to get instructions" for the appt    I didn't see a task in his chart    And not able to reach MA    So mother said someone can call her back re this

## 2020-06-29 ENCOUNTER — OFFICE VISIT (OUTPATIENT)
Dept: INTERNAL MEDICINE CLINIC | Facility: CLINIC | Age: 40
End: 2020-06-29
Payer: COMMERCIAL

## 2020-06-29 VITALS
BODY MASS INDEX: 35.85 KG/M2 | TEMPERATURE: 97.7 F | DIASTOLIC BLOOD PRESSURE: 62 MMHG | HEART RATE: 90 BPM | WEIGHT: 202.4 LBS | SYSTOLIC BLOOD PRESSURE: 110 MMHG | OXYGEN SATURATION: 96 %

## 2020-06-29 DIAGNOSIS — E66.9 OBESITY (BMI 30-39.9): ICD-10-CM

## 2020-06-29 DIAGNOSIS — Z00.00 MEDICARE ANNUAL WELLNESS VISIT, INITIAL: ICD-10-CM

## 2020-06-29 DIAGNOSIS — E78.2 MIXED HYPERLIPIDEMIA: Primary | Chronic | ICD-10-CM

## 2020-06-29 DIAGNOSIS — E55.9 VITAMIN D DEFICIENCY: Chronic | ICD-10-CM

## 2020-06-29 DIAGNOSIS — Q90.9 DOWN'S SYNDROME: ICD-10-CM

## 2020-06-29 DIAGNOSIS — R73.03 PREDIABETES: Chronic | ICD-10-CM

## 2020-06-29 PROCEDURE — 1036F TOBACCO NON-USER: CPT | Performed by: INTERNAL MEDICINE

## 2020-06-29 PROCEDURE — 99214 OFFICE O/P EST MOD 30 MIN: CPT | Performed by: INTERNAL MEDICINE

## 2020-06-29 PROCEDURE — G0438 PPPS, INITIAL VISIT: HCPCS | Performed by: INTERNAL MEDICINE

## 2020-09-18 DIAGNOSIS — R73.03 PREDIABETES: Chronic | ICD-10-CM

## 2020-09-29 DIAGNOSIS — E55.9 VITAMIN D DEFICIENCY: Chronic | ICD-10-CM

## 2020-09-29 RX ORDER — ERGOCALCIFEROL 1.25 MG/1
50000 CAPSULE ORAL WEEKLY
Qty: 12 CAPSULE | Refills: 3 | Status: SHIPPED | OUTPATIENT
Start: 2020-09-29 | End: 2021-01-11 | Stop reason: SDUPTHER

## 2020-09-29 NOTE — TELEPHONE ENCOUNTER
CALL BACK # [id number]    ergocalciferol (VITAMIN D2) 50,000 units    [facility] # [phone]

## 2020-10-06 ENCOUNTER — CLINICAL SUPPORT (OUTPATIENT)
Dept: INTERNAL MEDICINE CLINIC | Facility: CLINIC | Age: 40
End: 2020-10-06
Payer: COMMERCIAL

## 2020-10-06 DIAGNOSIS — Z23 NEED FOR INFLUENZA VACCINATION: Primary | ICD-10-CM

## 2020-10-06 DIAGNOSIS — E78.2 MIXED HYPERLIPIDEMIA: Chronic | ICD-10-CM

## 2020-10-06 PROCEDURE — G0008 ADMIN INFLUENZA VIRUS VAC: HCPCS

## 2020-10-06 PROCEDURE — 90686 IIV4 VACC NO PRSV 0.5 ML IM: CPT

## 2020-10-06 RX ORDER — OMEGA-3-ACID ETHYL ESTERS 1 G/1
1 CAPSULE, LIQUID FILLED ORAL DAILY
Qty: 90 CAPSULE | Refills: 2 | Status: SHIPPED | OUTPATIENT
Start: 2020-10-06

## 2020-12-22 DIAGNOSIS — E78.2 MIXED HYPERLIPIDEMIA: Chronic | ICD-10-CM

## 2020-12-22 RX ORDER — ATORVASTATIN CALCIUM 20 MG/1
TABLET, FILM COATED ORAL
Qty: 90 TABLET | Refills: 0 | Status: SHIPPED | OUTPATIENT
Start: 2020-12-22 | End: 2021-03-24

## 2021-01-05 ENCOUNTER — LAB (OUTPATIENT)
Dept: LAB | Facility: CLINIC | Age: 41
End: 2021-01-05
Payer: COMMERCIAL

## 2021-01-05 DIAGNOSIS — E55.9 VITAMIN D DEFICIENCY: Chronic | ICD-10-CM

## 2021-01-05 DIAGNOSIS — R73.03 PREDIABETES: Chronic | ICD-10-CM

## 2021-01-05 DIAGNOSIS — E78.2 MIXED HYPERLIPIDEMIA: Chronic | ICD-10-CM

## 2021-01-05 LAB
25(OH)D3 SERPL-MCNC: 50.9 NG/ML (ref 30–100)
ALBUMIN SERPL BCP-MCNC: 3.4 G/DL (ref 3.5–5)
ALP SERPL-CCNC: 74 U/L (ref 46–116)
ALT SERPL W P-5'-P-CCNC: 70 U/L (ref 12–78)
ANION GAP SERPL CALCULATED.3IONS-SCNC: 6 MMOL/L (ref 4–13)
AST SERPL W P-5'-P-CCNC: 32 U/L (ref 5–45)
BASOPHILS # BLD AUTO: 0.12 THOUSANDS/ΜL (ref 0–0.1)
BASOPHILS NFR BLD AUTO: 2 % (ref 0–1)
BILIRUB SERPL-MCNC: 0.34 MG/DL (ref 0.2–1)
BUN SERPL-MCNC: 11 MG/DL (ref 5–25)
CALCIUM ALBUM COR SERPL-MCNC: 9.6 MG/DL (ref 8.3–10.1)
CALCIUM SERPL-MCNC: 9.1 MG/DL (ref 8.3–10.1)
CHLORIDE SERPL-SCNC: 104 MMOL/L (ref 100–108)
CHOLEST SERPL-MCNC: 227 MG/DL (ref 50–200)
CO2 SERPL-SCNC: 28 MMOL/L (ref 21–32)
CREAT SERPL-MCNC: 0.96 MG/DL (ref 0.6–1.3)
EOSINOPHIL # BLD AUTO: 0.11 THOUSAND/ΜL (ref 0–0.61)
EOSINOPHIL NFR BLD AUTO: 2 % (ref 0–6)
ERYTHROCYTE [DISTWIDTH] IN BLOOD BY AUTOMATED COUNT: 16.2 % (ref 11.6–15.1)
EST. AVERAGE GLUCOSE BLD GHB EST-MCNC: 120 MG/DL
GFR SERPL CREATININE-BSD FRML MDRD: 97 ML/MIN/1.73SQ M
GLUCOSE P FAST SERPL-MCNC: 92 MG/DL (ref 65–99)
HBA1C MFR BLD: 5.8 %
HCT VFR BLD AUTO: 47.6 % (ref 36.5–49.3)
HDLC SERPL-MCNC: 47 MG/DL
HGB BLD-MCNC: 16 G/DL (ref 12–17)
IMM GRANULOCYTES # BLD AUTO: 0.03 THOUSAND/UL (ref 0–0.2)
IMM GRANULOCYTES NFR BLD AUTO: 1 % (ref 0–2)
LDLC SERPL CALC-MCNC: 148 MG/DL (ref 0–100)
LYMPHOCYTES # BLD AUTO: 2.04 THOUSANDS/ΜL (ref 0.6–4.47)
LYMPHOCYTES NFR BLD AUTO: 32 % (ref 14–44)
MCH RBC QN AUTO: 31.9 PG (ref 26.8–34.3)
MCHC RBC AUTO-ENTMCNC: 33.6 G/DL (ref 31.4–37.4)
MCV RBC AUTO: 95 FL (ref 82–98)
MONOCYTES # BLD AUTO: 0.62 THOUSAND/ΜL (ref 0.17–1.22)
MONOCYTES NFR BLD AUTO: 10 % (ref 4–12)
NEUTROPHILS # BLD AUTO: 3.46 THOUSANDS/ΜL (ref 1.85–7.62)
NEUTS SEG NFR BLD AUTO: 53 % (ref 43–75)
NONHDLC SERPL-MCNC: 180 MG/DL
NRBC BLD AUTO-RTO: 0 /100 WBCS
PLATELET # BLD AUTO: 333 THOUSANDS/UL (ref 149–390)
PMV BLD AUTO: 9.1 FL (ref 8.9–12.7)
POTASSIUM SERPL-SCNC: 4.2 MMOL/L (ref 3.5–5.3)
PROT SERPL-MCNC: 7.6 G/DL (ref 6.4–8.2)
RBC # BLD AUTO: 5.02 MILLION/UL (ref 3.88–5.62)
SODIUM SERPL-SCNC: 138 MMOL/L (ref 136–145)
TRIGL SERPL-MCNC: 161 MG/DL
TSH SERPL DL<=0.05 MIU/L-ACNC: 1.68 UIU/ML (ref 0.36–3.74)
WBC # BLD AUTO: 6.38 THOUSAND/UL (ref 4.31–10.16)

## 2021-01-05 PROCEDURE — 82306 VITAMIN D 25 HYDROXY: CPT

## 2021-01-05 PROCEDURE — 80061 LIPID PANEL: CPT

## 2021-01-05 PROCEDURE — 84443 ASSAY THYROID STIM HORMONE: CPT

## 2021-01-05 PROCEDURE — 83036 HEMOGLOBIN GLYCOSYLATED A1C: CPT

## 2021-01-05 PROCEDURE — 85025 COMPLETE CBC W/AUTO DIFF WBC: CPT

## 2021-01-05 PROCEDURE — 36415 COLL VENOUS BLD VENIPUNCTURE: CPT

## 2021-01-05 PROCEDURE — 80053 COMPREHEN METABOLIC PANEL: CPT

## 2021-01-11 ENCOUNTER — OFFICE VISIT (OUTPATIENT)
Dept: INTERNAL MEDICINE CLINIC | Facility: CLINIC | Age: 41
End: 2021-01-11
Payer: COMMERCIAL

## 2021-01-11 VITALS
DIASTOLIC BLOOD PRESSURE: 70 MMHG | BODY MASS INDEX: 37.74 KG/M2 | TEMPERATURE: 97.1 F | SYSTOLIC BLOOD PRESSURE: 110 MMHG | HEART RATE: 92 BPM | OXYGEN SATURATION: 95 % | HEIGHT: 63 IN | WEIGHT: 213 LBS

## 2021-01-11 DIAGNOSIS — R73.03 PREDIABETES: Chronic | ICD-10-CM

## 2021-01-11 DIAGNOSIS — E78.2 MIXED HYPERLIPIDEMIA: Primary | Chronic | ICD-10-CM

## 2021-01-11 DIAGNOSIS — J30.1 SEASONAL ALLERGIC RHINITIS DUE TO POLLEN: ICD-10-CM

## 2021-01-11 DIAGNOSIS — E55.9 VITAMIN D DEFICIENCY: Chronic | ICD-10-CM

## 2021-01-11 DIAGNOSIS — E66.9 OBESITY (BMI 30-39.9): ICD-10-CM

## 2021-01-11 PROCEDURE — 3008F BODY MASS INDEX DOCD: CPT | Performed by: INTERNAL MEDICINE

## 2021-01-11 PROCEDURE — 1036F TOBACCO NON-USER: CPT | Performed by: INTERNAL MEDICINE

## 2021-01-11 PROCEDURE — 3725F SCREEN DEPRESSION PERFORMED: CPT | Performed by: INTERNAL MEDICINE

## 2021-01-11 PROCEDURE — 99214 OFFICE O/P EST MOD 30 MIN: CPT | Performed by: INTERNAL MEDICINE

## 2021-01-11 RX ORDER — ERGOCALCIFEROL 1.25 MG/1
50000 CAPSULE ORAL WEEKLY
Qty: 12 CAPSULE | Refills: 3 | Status: SHIPPED | OUTPATIENT
Start: 2021-01-11 | End: 2021-02-22 | Stop reason: SDUPTHER

## 2021-01-11 NOTE — PROGRESS NOTES
INTERNAL MEDICINE OFFICE VISIT  [facility] AT [facility], [address]  Tel: [phone]      NAME: Mireya Ann  AGE: 41 y o  SEX: male  : 1980   MRN: [id number]    DATE: 2021  TIME: 8:44 AM      Assessment and Plan:  1  Mixed hyperlipidemia   continue atorvastatin  He the mother does not want me to increase the dose and says she will control his diet  I will recheck labs in 6 months  - CBC and differential; Future  - Comprehensive metabolic panel; Future  - Lipid panel; Future  - TSH, 3rd generation; Future    2  Prediabetes   was advised to cut down on the carbohydrates as much as possible and continue metformin  - Hemoglobin A1C; Future  - metFORMIN (GLUCOPHAGE) 500 mg tablet; Take 1 tablet (500 mg total) by mouth 2 (two) times a day with meals  Dispense: 180 tablet; Refill: 3    3  Seasonal allergic rhinitis due to pollen   continue medications    4  Vitamin D deficiency   continue vitamin-D  - ergocalciferol (VITAMIN D2) 50,000 units; Take 1 capsule (50,000 Units total) by mouth once a week  Dispense: 12 capsule; Refill: 3    5  Obesity (BMI 30-39  9)  BMI Counseling: Body mass index is 37 73 kg/m²  The BMI is above normal  Nutrition recommendations include decreasing portion sizes, encouraging healthy choices of fruits and vegetables and moderation in carbohydrate intake  Exercise recommendations include moderate physical activity 150 minutes/week  No pharmacotherapy was ordered  - Counseling Documentation: patient was counseled regarding: diagnostic results, instructions for management, risk factor reductions, prognosis, patient and family education, risks and benefits of treatment options and importance of compliance with treatment  - Medication Side Effects: Adverse side effects of medications were reviewed with the patient/guardian today  Return for follow up visit in  6 months or earlier, if needed        Chief Complaint:  Chief Complaint   Patient presents with    Follow-up     6 months         History of Present Illness:    hyperlipidemia-takes atorvastatin but the lipid profile is abnormal this time with the LDL of 148   Prediabetes -stable on metformin   Seasonal allergies -stable on medication   Vitamin-D deficiency -stable, vitamin-D level is good  Obesity -has gained a lot of weight      Active Problem List:  Patient Active Problem List   Diagnosis    Mixed hyperlipidemia    Prediabetes    Down's syndrome    Vitamin D deficiency    Seasonal allergic rhinitis due to pollen    Obesity (BMI 30-39  9)    Disturbance in speech         Past Medical History:  Past Medical History:   Diagnosis Date    Acne     Acne     Acne vulgaris     Allergic     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[address] )     Asthma due to environmental allergies 10/19/2018    Complete trisomy 21 syndrome     Development delay     Disturbance in speech     Down syndrome     Down's syndrome     Hyperlipidemia     Impacted cerumen     Impaired fasting glucose     Obesity     Rectal bleeding     Speech delays     Vitamin D deficiency          Past Surgical History:  Past Surgical History:   Procedure Laterality Date    CO COLONOSCOPY FLX DX W/COLLJ SPEC WHEN PFRMD N/A 8/21/2018    Procedure: COLONOSCOPY;  Surgeon: Facundo Appiah MD;  Location: [facility];   Service: Colorectal    TONSILLECTOMY  1981    TYMPANOSTOMY  1981         Family History:  Family History   Problem Relation Age of Onset    Diabetes Mother     Hyperlipidemia Mother          Social History:  Social History     Socioeconomic History    Marital status: Single     Spouse name: None    Number of children: None    Years of education: None    Highest education level: None   Occupational History    None   Social Needs    Financial resource strain: None    Food insecurity     Worry: None     Inability: None    Transportation needs     Medical: None     Non-medical: None Tobacco Use    Smoking status: Never Smoker    Smokeless tobacco: Never Used    Tobacco comment: NO PASSIVE SMOKE EXPOSURE   Substance and Sexual Activity    Alcohol use: No    Drug use: No    Sexual activity: Never   Lifestyle    Physical activity     Days per week: 3 days     Minutes per session: 30 min    Stress: Patient refused   Relationships    Social connections     Talks on phone: None     Gets together: None     Attends Faith service: None     Active member of club or organization: None     Attends meetings of clubs or organizations: None     Relationship status: None    Intimate partner violence     Fear of current or ex partner: None     Emotionally abused: None     Physically abused: None     Forced sexual activity: None   Other Topics Concern    None   Social History Narrative    None         Allergies:  No Known Allergies      Medications:    Current Outpatient Medications:     adapalene (DIFFERIN) 0 1 % gel, daily, Disp: , Rfl:     atorvastatin (LIPITOR) 20 mg tablet, TAKE ONE TABLET BY MOUTH EVERY DAY , Disp: 90 tablet, Rfl: 0    azelastine (ASTELIN) 0 1 % nasal spray, 2 sprays into each nostril 2 (two) times a day Use in each nostril as directed (Patient taking differently: 2 sprays into each nostril 2 (two) times a day Use in each nostril as directed ), Disp: 90 mL, Rfl: 2    ergocalciferol (VITAMIN D2) 50,000 units, Take 1 capsule (50,000 Units total) by mouth once a week, Disp: 12 capsule, Rfl: 3    fexofenadine (ALLEGRA) 180 MG tablet, Take 1 tablet (180 mg total) by mouth daily (Patient taking differently: Take 180 mg by mouth as needed ), Disp: 90 tablet, Rfl: 1    metFORMIN (GLUCOPHAGE) 500 mg tablet, Take 1 tablet (500 mg total) by mouth 2 (two) times a day with meals, Disp: 180 tablet, Rfl: 3    omega-3-acid ethyl esters (LOVAZA) 1 g capsule, Take 1 capsule (1 g total) by mouth daily, Disp: 90 capsule, Rfl: 2    tretinoin (RETIN-A) 0 025 % gel, daily, Disp: , Rfl: The following portions of the patient's history were reviewed and updated as appropriate: past medical history, past surgical history, family history, social history, allergies, current medications and active problem list       Review of Systems:  Constitutional: Denies fever, chills, weight gain, weight loss, fatigue  Eyes: Denies eye redness, eye discharge, double vision, change in visual acuity  ENT: Denies hearing loss, tinnitus, sneezing, nasal congestion, nasal discharge, sore throat   Respiratory: Denies cough, expectoration, hemoptysis, shortness of breath, wheezing  Cardiovascular: Denies chest pain, palpitations, lower extremity swelling, orthopnea, PND  Gastrointestinal: Denies abdominal pain, heartburn, nausea, vomiting, hematemesis, diarrhea, bloody stools  Genito-Urinary: Denies dysuria, frequency, difficulty in micturition, nocturia, incontinence  Musculoskeletal: Denies back pain, joint pain, muscle pain  Neurologic: Denies confusion, lightheadedness, syncope, headache, focal weakness, sensory changes, seizures  Endocrine: Denies polyuria, polydipsia, temperature intolerance  Allergy and Immunology: Denies hives, insect bite sensitivity  Hematological and Lymphatic: Denies bleeding problems, swollen glands   Psychological: Denies depression, suicidal ideation, anxiety, panic, mood swings  Dermatological: Denies pruritus, rash, skin lesion changes      Vitals:  Vitals:    01/11/21 0812   BP: 110/70   Pulse: 92   Temp: (!) 97 1 °F (36 2 °C)   SpO2: 95%       Body mass index is 37 73 kg/m²  Weight (last 2 days)     Date/Time   Weight    01/11/21 0812   96 6 (213)                Physical Examination:  General: Patient is not in acute distress  Awake, alert, responding to commands  No weight gain or loss  Head: Normocephalic  Atraumatic  Eyes: Conjunctiva and lids with no swelling, erythema or discharge  Both pupils normal sized, round and reactive to light   Sclera nonicteric  ENT: External examination of nose and ear normal  Otoscopic examination shows translucent tympanic membranes with patent canals without erythema  Oropharynx moist with no erythema, edema, exudate or lesions  Neck: Supple  JVP not raised  Trachea midline  No masses  No thyromegaly  Lungs: No signs of increased work of breathing or respiratory distress  Bilateral bronchovascular breath sounds with no crackles or rhonchi  Chest wall: No tenderness  Cardiovascular: Normal PMI  No thrills  Regular rate and rhythm  S1 and S2 normal  No murmur, rub or gallop  Gastrointestinal: Abdomen soft, nontender  No guarding or rigidity  Liver and spleen not palpable  Bowel sounds present  Neurologic: Cranial nerves II-XII intact   Cortical functions normal  Motor system - Reflexes 2+ and symmetrical  Sensations normal  Musculoskeletal: Gait normal  No joint tenderness  Integumentary: Skin normal with no rash or lesions  Lymphatic: No palpable lymph nodes in neck, axilla or groin  Extremities: No clubbing, cyanosis, edema or varicosities  Psychological: Judgement and insight normal  Mood and affect normal      Laboratory Results:  CBC with diff:   Lab Results   Component Value Date    WBC 6 38 01/05/2021    RBC 5 02 01/05/2021    HGB 16 0 01/05/2021    HCT 47 6 01/05/2021    MCV 95 01/05/2021    MCH 31 9 01/05/2021    RDW 16 2 (H) 01/05/2021     01/05/2021       CMP:  Lab Results   Component Value Date    CREATININE 0 96 01/05/2021    CREATININE 1 02 11/10/2015    BUN 11 01/05/2021    BUN 15 11/10/2015     11/10/2015    K 4 2 01/05/2021    K 4 3 11/10/2015     01/05/2021     11/10/2015    CO2 28 01/05/2021    CO2 27 11/10/2015    GLUCOSE 90 11/10/2015    PROT 7 6 11/10/2015    ALKPHOS 74 01/05/2021    ALKPHOS 75 11/10/2015    ALT 70 01/05/2021    ALT 65 11/10/2015    AST 32 01/05/2021    AST 25 11/10/2015    BILIDIR 0 09 07/21/2017       Lab Results   Component Value Date    HGBA1C 5 8 (H) 01/05/2021    HGBA1C 5 6 11/10/2015 No results found for: TROPONINI, CKMB, CKTOTAL    Lipid Profile:   Lab Results   Component Value Date    CHOL 208 11/10/2015     Lab Results   Component Value Date    HDL 47 01/05/2021    HDL 30 (L) 03/11/2020     Lab Results   Component Value Date    LDLCALC 148 (H) 01/05/2021    LDLCALC 101 (H) 03/11/2020     Lab Results   Component Value Date    TRIG 161 (H) 01/05/2021    TRIG 131 03/11/2020       Imaging Results:  XR foot 2 vw right  Narrative: RIGHT FOOT    INDICATION:  Right foot pain  Swelling  Limping  COMPARISON: None    VIEWS:  AP and lateral; 2 images    FINDINGS:    There is no acute fracture or dislocation  Periarticular cyst is noted in the medial margin of the head of the 1st metatarsal     No lytic or blastic lesions are seen  Soft tissue swelling noted medial to the 1st metatarsophalangeal joint  Impression: Nonspecific arthritic changes at the 1st metatarsophalangeal joint suspicious for gout  No calcified tophus is appreciated      Workstation performed: [id number]       Health Maintenance:  Health Maintenance   Topic Date Due    HIV Screening  05/18/1993    BMI: Followup Plan  06/17/2021    Medicare Annual Wellness Visit (AWV)  06/29/2021    Depression Screening PHQ  01/11/2022    BMI: Adult  01/11/2022    DTaP,Tdap,and Td Vaccines (3 - Td) 10/19/2028    Influenza Vaccine  Completed    Pneumococcal Vaccine: Pediatrics (0 to 5 Years) and At-Risk Patients (6 to 59 Years)  Aged Out    HIB Vaccine  Aged Out    Hepatitis B Vaccine  Aged Out    IPV Vaccine  Aged Out    Hepatitis A Vaccine  Aged Out    Meningococcal ACWY Vaccine  Aged Out    HPV Vaccine  Aged [employer] History   Administered Date(s) Administered    Fluzone Split Quad 0 25 mL 11/15/2017    Fluzone Split Quad 0 5 mL 11/23/2016    H1N1, All Formulations 12/16/2009    INFLUENZA 10/23/2006, 11/17/2008, 09/21/2009, 10/29/2010, 10/06/2011, 10/26/2012, 09/14/2015, 11/23/2016, 11/15/2017    Influenza Split 10/24/2013    Influenza Split High Dose Preservative Free IM 12/15/2009    Influenza, injectable, quadrivalent, preservative free 0 5 mL 10/19/2018, 11/07/2019, 10/06/2020    Influenza, seasonal, injectable 11/03/2014    Influenza, seasonal, injectable, preservative free 10/23/2006, 11/17/2008, 09/21/2009, 10/29/2010, 10/06/2011    Tdap 09/08/2008, 10/19/2018    Tuberculin Skin Test-PPD Intradermal 02/02/2013, 02/15/2019         Lulu Cedillo MD  1/11/2021,8:44 AM

## 2021-02-22 DIAGNOSIS — E55.9 VITAMIN D DEFICIENCY: Chronic | ICD-10-CM

## 2021-02-22 RX ORDER — ERGOCALCIFEROL 1.25 MG/1
50000 CAPSULE ORAL WEEKLY
Qty: 12 CAPSULE | Refills: 3 | Status: SHIPPED | OUTPATIENT
Start: 2021-02-22

## 2021-03-24 DIAGNOSIS — E78.2 MIXED HYPERLIPIDEMIA: Chronic | ICD-10-CM

## 2021-03-24 RX ORDER — ATORVASTATIN CALCIUM 20 MG/1
TABLET, FILM COATED ORAL
Qty: 90 TABLET | Refills: 0 | Status: SHIPPED | OUTPATIENT
Start: 2021-03-24 | End: 2021-06-21

## 2021-04-13 DIAGNOSIS — Z23 ENCOUNTER FOR IMMUNIZATION: ICD-10-CM

## 2021-06-20 DIAGNOSIS — E78.2 MIXED HYPERLIPIDEMIA: Chronic | ICD-10-CM

## 2021-06-21 RX ORDER — ATORVASTATIN CALCIUM 20 MG/1
TABLET, FILM COATED ORAL
Qty: 90 TABLET | Refills: 0 | Status: SHIPPED | OUTPATIENT
Start: 2021-06-21 | End: 2021-09-17

## 2021-09-17 DIAGNOSIS — E78.2 MIXED HYPERLIPIDEMIA: Chronic | ICD-10-CM

## 2021-09-17 RX ORDER — ATORVASTATIN CALCIUM 20 MG/1
TABLET, FILM COATED ORAL
Qty: 90 TABLET | Refills: 0 | Status: SHIPPED | OUTPATIENT
Start: 2021-09-17 | End: 2021-12-11

## 2021-12-11 DIAGNOSIS — E78.2 MIXED HYPERLIPIDEMIA: Chronic | ICD-10-CM

## 2021-12-11 RX ORDER — ATORVASTATIN CALCIUM 20 MG/1
TABLET, FILM COATED ORAL
Qty: 90 TABLET | Refills: 0 | Status: SHIPPED | OUTPATIENT
Start: 2021-12-11

## 2023-09-01 ENCOUNTER — APPOINTMENT (OUTPATIENT)
Age: 43
End: 2023-09-01
Payer: COMMERCIAL

## 2023-09-01 DIAGNOSIS — E78.2 MIXED HYPERLIPIDEMIA: ICD-10-CM

## 2023-09-01 DIAGNOSIS — R73.03 PRE-DIABETES: ICD-10-CM

## 2023-09-01 LAB
ALBUMIN SERPL BCP-MCNC: 3.7 G/DL (ref 3.5–5)
ALP SERPL-CCNC: 63 U/L (ref 34–104)
ALT SERPL W P-5'-P-CCNC: 28 U/L (ref 7–52)
ANION GAP SERPL CALCULATED.3IONS-SCNC: 5 MMOL/L
AST SERPL W P-5'-P-CCNC: 24 U/L (ref 13–39)
BILIRUB SERPL-MCNC: 0.37 MG/DL (ref 0.2–1)
BUN SERPL-MCNC: 10 MG/DL (ref 5–25)
CALCIUM SERPL-MCNC: 8.8 MG/DL (ref 8.4–10.2)
CHLORIDE SERPL-SCNC: 101 MMOL/L (ref 96–108)
CHOLEST SERPL-MCNC: 205 MG/DL
CO2 SERPL-SCNC: 31 MMOL/L (ref 21–32)
CREAT SERPL-MCNC: 0.74 MG/DL (ref 0.6–1.3)
GFR SERPL CREATININE-BSD FRML MDRD: 111 ML/MIN/1.73SQ M
GLUCOSE P FAST SERPL-MCNC: 93 MG/DL (ref 65–99)
HDLC SERPL-MCNC: 56 MG/DL
LDLC SERPL CALC-MCNC: 118 MG/DL (ref 0–100)
NONHDLC SERPL-MCNC: 149 MG/DL
POTASSIUM SERPL-SCNC: 4.9 MMOL/L (ref 3.5–5.3)
PROT SERPL-MCNC: 7.4 G/DL (ref 6.4–8.4)
SODIUM SERPL-SCNC: 137 MMOL/L (ref 135–147)
TRIGL SERPL-MCNC: 156 MG/DL

## 2023-09-01 PROCEDURE — 80053 COMPREHEN METABOLIC PANEL: CPT

## 2023-09-01 PROCEDURE — 83036 HEMOGLOBIN GLYCOSYLATED A1C: CPT

## 2023-09-01 PROCEDURE — 36415 COLL VENOUS BLD VENIPUNCTURE: CPT

## 2023-09-01 PROCEDURE — 80061 LIPID PANEL: CPT

## 2023-09-05 LAB
EST. AVERAGE GLUCOSE BLD GHB EST-MCNC: 131 MG/DL
HBA1C MFR BLD: 6.2 %

## 2023-10-26 RX ORDER — RISPERIDONE 1 MG/1
TABLET ORAL
COMMUNITY
Start: 2023-08-10 | End: 2023-10-27

## 2023-10-26 RX ORDER — DEXTROMETHORPHAN HYDROBROMIDE AND PROMETHAZINE HYDROCHLORIDE 15; 6.25 MG/5ML; MG/5ML
5 SYRUP ORAL 4 TIMES DAILY PRN
COMMUNITY
Start: 2023-09-14 | End: 2023-10-27

## 2023-10-26 RX ORDER — CITALOPRAM HYDROBROMIDE 10 MG/1
TABLET ORAL
COMMUNITY
Start: 2023-08-08

## 2023-10-26 RX ORDER — ZOLPIDEM TARTRATE 10 MG/1
TABLET ORAL
COMMUNITY
Start: 2023-08-08

## 2023-10-26 RX ORDER — CLINDAMYCIN PHOSPHATE 10 MG/G
GEL TOPICAL
COMMUNITY
Start: 2023-07-28

## 2023-10-26 RX ORDER — DOXYCYCLINE HYCLATE 100 MG
TABLET ORAL
COMMUNITY
Start: 2023-07-27

## 2023-10-26 RX ORDER — ATORVASTATIN CALCIUM 40 MG/1
TABLET, FILM COATED ORAL
COMMUNITY
Start: 2023-07-29 | End: 2023-10-27

## 2023-10-27 ENCOUNTER — CONSULT (OUTPATIENT)
Dept: GASTROENTEROLOGY | Facility: CLINIC | Age: 43
End: 2023-10-27
Payer: COMMERCIAL

## 2023-10-27 VITALS
SYSTOLIC BLOOD PRESSURE: 124 MMHG | DIASTOLIC BLOOD PRESSURE: 80 MMHG | BODY MASS INDEX: 40.11 KG/M2 | HEIGHT: 63 IN | WEIGHT: 226.4 LBS | HEART RATE: 105 BPM | OXYGEN SATURATION: 95 %

## 2023-10-27 DIAGNOSIS — K62.5 GASTROINTESTINAL HEMORRHAGE ASSOCIATED WITH ANORECTAL SOURCE: ICD-10-CM

## 2023-10-27 DIAGNOSIS — E66.01 SEVERE OBESITY (BMI >= 40) (HCC): ICD-10-CM

## 2023-10-27 DIAGNOSIS — R14.2 BELCHING: ICD-10-CM

## 2023-10-27 DIAGNOSIS — R10.32 LEFT LOWER QUADRANT ABDOMINAL PAIN: Primary | ICD-10-CM

## 2023-10-27 PROCEDURE — 99203 OFFICE O/P NEW LOW 30 MIN: CPT | Performed by: PHYSICIAN ASSISTANT

## 2023-10-27 RX ORDER — SOD SULF/POT CHLORIDE/MAG SULF 1.479 G
1 TABLET ORAL ONCE
Qty: 24 TABLET | Refills: 0 | Status: SHIPPED | OUTPATIENT
Start: 2023-10-27 | End: 2023-10-27

## 2023-10-27 NOTE — PATIENT INSTRUCTIONS
Scheduled date of EGD/colonoscopy (as of today):12/5/23  Physician performing EGD/colonoscopy:María  Location of EGD/colonoscopy:[facility]  Desired bowel prep reviewed with patient:SUTAB  Instructions reviewed with patient by:Maurice [email or web address]  Clearances:   none

## 2023-10-27 NOTE — PROGRESS NOTES
[facility] - Outpatient Consultation  Corbin Murphy 37 y.o. male MRN: [id number]  Encounter: [id number]          ASSESSMENT AND PLAN:      1. Left lower quadrant abdominal pain  2. Gastrointestinal hemorrhage associated with anorectal source  Patient's mom notes occasional episodes of blood on the toilet tissue and/or in the water  Bright red  He fluctuates between diarrhea and constipation  He does not have a great diet    Advised fiber and probiotic  Encourage plenty of fiber foods  Encourage 64oz water per day    Will plan EGD    3. Belching  Suspect this is related to overeating/eating quickly  Will plan EGD  ______________________________________________________________________    HPI: 25-year-old male with a history of Down syndrome, prediabetes, hyperlipidemia and obesity who is brought to the clinic today for evaluation of rectal bleeding. Per the patient's mom who is his primary caretaker she has noted blood on the toilet tissue when wiping and occasional episodes of blood in the toilet. She reports that it is always bright red in color. She reports that he fluctuates between diarrhea and constipation. This has been the case for a long time. He has some mild left lower quadrant discomfort. There has been no unexpected weight loss. He has no nausea or vomiting. He has never had a colonoscopy. Although he does eat the same foods that her and her  eat he does not always have the healthiest diet. He is difficult to get him to eat regular food. Due to her 's renal issues the family follows a renal diet which limits some vegetables. He has a habit of overeating and eating quickly. She notes that when he does this he has excessive belching. He does not complain of things and so she does not know if he is having any abdominal discomfort, nausea or heartburn symptoms. There does not seem to be any dysphagia.   He has never seen a gastroenterologist and so never had an EGD or colonoscopy. REVIEW OF SYSTEMS:    CONSTITUTIONAL: Denies any fever, chills, rigors, and weight loss. HEENT: No earache or tinnitus. Denies hearing loss or visual disturbances. CARDIOVASCULAR: No chest pain or palpitations. RESPIRATORY: Denies any cough, hemoptysis, shortness of breath or dyspnea on exertion. GASTROINTESTINAL: As noted in the History of Present Illness. GENITOURINARY: No problems with urination. Denies any hematuria or dysuria. NEUROLOGIC: No dizziness or vertigo, denies headaches. MUSCULOSKELETAL: Denies any muscle or joint pain. SKIN: Denies skin rashes or itching. ENDOCRINE: Denies excessive thirst. Denies intolerance to heat or cold. PSYCHOSOCIAL: Denies depression or anxiety. Denies any recent memory loss. Historical Information   Past Medical History:   Diagnosis Date    Acne     Acne     Acne vulgaris     Allergic     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[address])     Asthma due to environmental allergies 10/19/2018    Complete trisomy 21 syndrome     Development delay     Disturbance in speech     Down syndrome     Down's syndrome     Hyperlipidemia     Impacted cerumen     Impaired fasting glucose     Obesity     Rectal bleeding     Speech delays     Vitamin D deficiency      Past Surgical History:   Procedure Laterality Date    VA COLONOSCOPY FLX DX W/COLLJ SPEC WHEN PFRMD N/A 8/21/2018    Procedure: COLONOSCOPY;  Surgeon: Leyda Rodriguez MD;  Location: [address] [place] [address] GI LAB;   Service: Colorectal    TONSILLECTOMY  1981    TYMPANOSTOMY  1981     Social History   Social History     Substance and Sexual Activity   Alcohol Use No     Social History     Substance and Sexual Activity   Drug Use No     Social History     Tobacco Use   Smoking Status Never   Smokeless Tobacco Never   Tobacco Comments    NO PASSIVE SMOKE EXPOSURE     Family History   Problem Relation Age of Onset    Diabetes Mother     Hyperlipidemia Mother        Meds/Allergies       Current Outpatient Medications: atorvastatin (LIPITOR) 20 mg tablet    citalopram (CeleXA) 10 mg tablet    clindamycin (CLINDAGEL) 1 % gel    doxycycline hyclate (VIBRA-TABS) 100 mg tablet    ergocalciferol (VITAMIN D2) 50,000 units    metFORMIN (GLUCOPHAGE) 500 mg tablet    omega-3-acid ethyl esters (LOVAZA) 1 g capsule    tretinoin (RETIN-A) 0.025 % gel    zolpidem (AMBIEN) 10 mg tablet    adapalene (DIFFERIN) 0.1 % gel    atorvastatin (LIPITOR) 40 mg tablet    azelastine (ASTELIN) 0.1 % nasal spray    Cholecalciferol 125 MCG (5000 UT) capsule    fexofenadine (ALLEGRA) 180 MG tablet    promethazine-dextromethorphan (PHENERGAN-DM) 6.25-15 mg/5 mL oral syrup    risperiDONE (RisperDAL) 1 mg tablet    Allergies   Allergen Reactions    Pollen Extract Other (See Comments)           Objective     Blood pressure 124/80, pulse 105, height 5' 2.5" (1.588 m), weight 103 kg (226 lb 6.4 oz), SpO2 95 %. Body mass index is 40.75 kg/m². PHYSICAL EXAM:      General Appearance:   Alert, cooperative, no distress   HEENT:   Normocephalic, atraumatic, anicteric. Neck:  Supple, symmetrical, trachea midline   Lungs:   Clear to auscultation bilaterally; no rales, rhonchi or wheezing; respirations unlabored    Heart[de-identified]   Regular rate and rhythm; no murmur, rub, or gallop. Abdomen:   Soft, non-tender, non-distended; normal bowel sounds; no masses, no organomegaly    Genitalia:   Deferred    Rectal:   Deferred    Extremities:  No cyanosis, clubbing or edema    Pulses:  2+ and symmetric    Skin:  No jaundice, rashes, or lesions    Lymph nodes:  No palpable cervical lymphadenopathy        Lab Results:   No visits with results within 1 Day(s) from this visit.    Latest known visit with results is:   Appointment on 09/01/2023   Component Date Value    Sodium 09/01/2023 137     Potassium 09/01/2023 4.9     Chloride 09/01/2023 101     CO2 09/01/2023 31     ANION GAP 09/01/2023 5     BUN 09/01/2023 10     Creatinine 09/01/2023 0.74     Glucose, Fasting 09/01/2023 93 Calcium 09/01/2023 8.8     AST 09/01/2023 24     ALT 09/01/2023 28     Alkaline Phosphatase 09/01/2023 63     Total Protein 09/01/2023 7.4     Albumin 09/01/2023 3.7     Total Bilirubin 09/01/2023 0.37     eGFR 09/01/2023 111     Hemoglobin A1C 09/01/2023 6.2 (H)     EAG 09/01/2023 131     Cholesterol 09/01/2023 205 (H)     Triglycerides 09/01/2023 156 (H)     HDL, Direct 09/01/2023 56     LDL Calculated 09/01/2023 118 (H)     Non-HDL-Chol (CHOL-HDL) 09/01/2023 149          Radiology Results:   No results found.

## 2023-12-05 ENCOUNTER — ANESTHESIA EVENT (OUTPATIENT)
Dept: GASTROENTEROLOGY | Facility: HOSPITAL | Age: 43
End: 2023-12-05

## 2023-12-05 ENCOUNTER — ANESTHESIA (OUTPATIENT)
Dept: GASTROENTEROLOGY | Facility: HOSPITAL | Age: 43
End: 2023-12-05

## 2023-12-05 ENCOUNTER — HOSPITAL ENCOUNTER (OUTPATIENT)
Dept: GASTROENTEROLOGY | Facility: HOSPITAL | Age: 43
Setting detail: OUTPATIENT SURGERY
Discharge: HOME | End: 2023-12-05
Payer: COMMERCIAL

## 2023-12-05 VITALS
WEIGHT: 220.46 LBS | DIASTOLIC BLOOD PRESSURE: 83 MMHG | SYSTOLIC BLOOD PRESSURE: 138 MMHG | HEIGHT: 63 IN | TEMPERATURE: 97 F | HEART RATE: 89 BPM | BODY MASS INDEX: 39.06 KG/M2 | RESPIRATION RATE: 18 BRPM | OXYGEN SATURATION: 98 %

## 2023-12-05 DIAGNOSIS — K62.5 GASTROINTESTINAL HEMORRHAGE ASSOCIATED WITH ANORECTAL SOURCE: ICD-10-CM

## 2023-12-05 DIAGNOSIS — R10.32 LEFT LOWER QUADRANT ABDOMINAL PAIN: ICD-10-CM

## 2023-12-05 DIAGNOSIS — R14.2 BELCHING: ICD-10-CM

## 2023-12-05 PROCEDURE — 88305 TISSUE EXAM BY PATHOLOGIST: CPT | Performed by: STUDENT IN AN ORGANIZED HEALTH CARE EDUCATION/TRAINING PROGRAM

## 2023-12-05 PROCEDURE — 45378 DIAGNOSTIC COLONOSCOPY: CPT | Performed by: INTERNAL MEDICINE

## 2023-12-05 PROCEDURE — 43239 EGD BIOPSY SINGLE/MULTIPLE: CPT | Performed by: INTERNAL MEDICINE

## 2023-12-05 RX ORDER — LIDOCAINE HYDROCHLORIDE 10 MG/ML
0.5 INJECTION, SOLUTION EPIDURAL; INFILTRATION; INTRACAUDAL; PERINEURAL ONCE AS NEEDED
Status: CANCELLED | OUTPATIENT
Start: 2023-12-05

## 2023-12-05 RX ORDER — PROPOFOL 10 MG/ML
INJECTION, EMULSION INTRAVENOUS AS NEEDED
Status: DISCONTINUED | OUTPATIENT
Start: 2023-12-05 | End: 2023-12-05

## 2023-12-05 RX ORDER — GLYCOPYRROLATE 0.2 MG/ML
INJECTION INTRAMUSCULAR; INTRAVENOUS AS NEEDED
Status: DISCONTINUED | OUTPATIENT
Start: 2023-12-05 | End: 2023-12-05

## 2023-12-05 RX ORDER — KETAMINE HCL IN NACL, ISO-OSM 100MG/10ML
SYRINGE (ML) INJECTION AS NEEDED
Status: DISCONTINUED | OUTPATIENT
Start: 2023-12-05 | End: 2023-12-05

## 2023-12-05 RX ORDER — SODIUM CHLORIDE, SODIUM LACTATE, POTASSIUM CHLORIDE, CALCIUM CHLORIDE 600; 310; 30; 20 MG/100ML; MG/100ML; MG/100ML; MG/100ML
INJECTION, SOLUTION INTRAVENOUS CONTINUOUS PRN
Status: DISCONTINUED | OUTPATIENT
Start: 2023-12-05 | End: 2023-12-05

## 2023-12-05 RX ORDER — SODIUM CHLORIDE, SODIUM LACTATE, POTASSIUM CHLORIDE, CALCIUM CHLORIDE 600; 310; 30; 20 MG/100ML; MG/100ML; MG/100ML; MG/100ML
50 INJECTION, SOLUTION INTRAVENOUS CONTINUOUS
Status: CANCELLED | OUTPATIENT
Start: 2023-12-05

## 2023-12-05 RX ADMIN — PROPOFOL 80 MG: 10 INJECTION, EMULSION INTRAVENOUS at 12:18

## 2023-12-05 RX ADMIN — Medication 20 MG: at 12:24

## 2023-12-05 RX ADMIN — Medication 20 MG: at 12:18

## 2023-12-05 RX ADMIN — SODIUM CHLORIDE, SODIUM LACTATE, POTASSIUM CHLORIDE, AND CALCIUM CHLORIDE: .6; .31; .03; .02 INJECTION, SOLUTION INTRAVENOUS at 12:07

## 2023-12-05 RX ADMIN — SODIUM CHLORIDE, SODIUM LACTATE, POTASSIUM CHLORIDE, AND CALCIUM CHLORIDE: .6; .31; .03; .02 INJECTION, SOLUTION INTRAVENOUS at 11:22

## 2023-12-05 RX ADMIN — PROPOFOL 40 MG: 10 INJECTION, EMULSION INTRAVENOUS at 12:23

## 2023-12-05 RX ADMIN — PROPOFOL 100 MCG/KG/MIN: 10 INJECTION, EMULSION INTRAVENOUS at 12:29

## 2023-12-05 RX ADMIN — TOPICAL ANESTHETIC 1 SPRAY: 200 SPRAY DENTAL; PERIODONTAL at 12:12

## 2023-12-05 RX ADMIN — Medication 10 MG: at 12:32

## 2023-12-05 RX ADMIN — GLYCOPYRROLATE 0.2 MCG: 0.2 INJECTION, SOLUTION INTRAMUSCULAR; INTRAVENOUS at 12:13

## 2023-12-05 RX ADMIN — PROPOFOL 40 MG: 10 INJECTION, EMULSION INTRAVENOUS at 12:21

## 2023-12-05 NOTE — H&P
History and Physical - [facility]  Tono Shepard 37 y.o. male MRN: [id number]      HPI: Tono Shepard is a 37y.o. year old male who presents for Polyp polyp contacted patient ordered 2-week results of the polyp evaluation of abdominal pain, belching, rectal bleeding      REVIEW OF SYSTEMS: Per the HPI, and otherwise unremarkable. Historical Information   Past Medical History:   Diagnosis Date    Acne     Acne     Acne vulgaris     Allergic     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[address])     Asthma due to environmental allergies 10/19/2018    Complete trisomy 21 syndrome     Development delay     Disturbance in speech     Down syndrome     Down's syndrome     Hyperlipidemia     Impacted cerumen     Impaired fasting glucose     Obesity     Rectal bleeding     Speech delays     Vitamin D deficiency      Past Surgical History:   Procedure Laterality Date    OR COLONOSCOPY FLX DX W/COLLJ SPEC WHEN PFRMD N/A 8/21/2018    Procedure: COLONOSCOPY;  Surgeon: Tessa Wilkes MD;  Location: [address] [place] [address] GI LAB; Service: Colorectal    TONSILLECTOMY  1981    TYMPANOSTOMY  1981     Social History   Social History     Substance and Sexual Activity   Alcohol Use No     Social History     Substance and Sexual Activity   Drug Use No     Social History     Tobacco Use   Smoking Status Never   Smokeless Tobacco Never   Tobacco Comments    NO PASSIVE SMOKE EXPOSURE     Family History   Problem Relation Age of Onset    Diabetes Mother     Hyperlipidemia Mother        Meds/Allergies     (Not in a hospital admission)      Allergies   Allergen Reactions    Pollen Extract Other (See Comments)       Objective     Blood pressure 131/69, pulse 75, temperature (!) 97.4 °F (36.3 °C), temperature source Temporal, resp. rate 12, height 5' 3" (1.6 m), weight 100 kg (220 lb 7.4 oz), SpO2 98 %.       PHYSICAL EXAM    Gen: NAD  CV: RRR  CHEST: Clear  ABD: soft, NT/ND  EXT: no edema      ASSESSMENT/PLAN:  This is a 37y.o. year old male here for EGD, colonoscopy, and he is stable and optimized for his procedure.

## 2023-12-05 NOTE — ANESTHESIA PREPROCEDURE EVALUATION
Procedure:  COLONOSCOPY  EGD    Relevant Problems   CARDIO   (+) Mixed hyperlipidemia      Other   (+) Down's syndrome   (+) Obesity (BMI 30-39.9)   (+) Prediabetes      No recent fever, cough or other symptom of upper respiratory tract infection. Confirmed NPO appropriate    Physical Exam    Airway    Mallampati score: III  TM Distance: >3 FB  Neck ROM: full     Dental   No notable dental hx     Cardiovascular      Pulmonary      Other Findings        Anesthesia Plan  ASA Score- 3     Anesthesia Type- IV sedation with anesthesia with ASA Monitors. Additional Monitors:     Airway Plan:     Comment: I discussed the risks and benefits of IV sedation anesthesia including the possibility of the need to convert to general anesthesia and the potential risk of awareness. Plan Factors-Exercise tolerance (METS): >4 METS. Chart reviewed. Existing labs reviewed. Patient is not a current smoker. Patient did not smoke on day of surgery. Induction- intravenous. Postoperative Plan-     Informed Consent- Anesthetic plan and risks discussed with patient.

## 2023-12-05 NOTE — ANESTHESIA POSTPROCEDURE EVALUATION
Post-Op Assessment Note    CV Status:  Stable  Pain Score: 0    Pain management: adequate       Mental Status:  Arousable   Hydration Status:  Stable   PONV Controlled:  None   Airway Patency:  Patent     Post Op Vitals Reviewed: Yes      Staff: CRNA               /57 (12/05/23 1244)    Temp (!) 97 °F (36.1 °C) (12/05/23 1244)    Pulse 101 (12/05/23 1244)   Resp 18 (12/05/23 1244)    SpO2 97 % (12/05/23 1244)

## 2023-12-08 PROCEDURE — 88305 TISSUE EXAM BY PATHOLOGIST: CPT | Performed by: STUDENT IN AN ORGANIZED HEALTH CARE EDUCATION/TRAINING PROGRAM

## 2023-12-15 ENCOUNTER — TELEPHONE (OUTPATIENT)
Dept: UROLOGY | Facility: CLINIC | Age: 43
End: 2023-12-15

## 2023-12-15 NOTE — TELEPHONE ENCOUNTER
----- Message from Sisi Gomez DO sent at 12/12/2023  6:32 AM EST -----  Results were given to the patient's MyChart.